# Patient Record
Sex: MALE | Race: WHITE | NOT HISPANIC OR LATINO
[De-identification: names, ages, dates, MRNs, and addresses within clinical notes are randomized per-mention and may not be internally consistent; named-entity substitution may affect disease eponyms.]

---

## 2017-03-25 ENCOUNTER — TRANSCRIPTION ENCOUNTER (OUTPATIENT)
Age: 67
End: 2017-03-25

## 2018-02-18 ENCOUNTER — TRANSCRIPTION ENCOUNTER (OUTPATIENT)
Age: 68
End: 2018-02-18

## 2018-02-28 ENCOUNTER — TRANSCRIPTION ENCOUNTER (OUTPATIENT)
Age: 68
End: 2018-02-28

## 2019-03-27 ENCOUNTER — TRANSCRIPTION ENCOUNTER (OUTPATIENT)
Age: 69
End: 2019-03-27

## 2019-05-09 ENCOUNTER — APPOINTMENT (OUTPATIENT)
Dept: UROLOGY | Facility: CLINIC | Age: 69
End: 2019-05-09
Payer: MEDICARE

## 2019-05-09 VITALS
RESPIRATION RATE: 16 BRPM | TEMPERATURE: 96.8 F | SYSTOLIC BLOOD PRESSURE: 152 MMHG | WEIGHT: 160 LBS | BODY MASS INDEX: 25.11 KG/M2 | HEART RATE: 50 BPM | HEIGHT: 67 IN | DIASTOLIC BLOOD PRESSURE: 80 MMHG

## 2019-05-09 PROCEDURE — 99204 OFFICE O/P NEW MOD 45 MIN: CPT

## 2019-05-09 RX ORDER — TAMSULOSIN HYDROCHLORIDE 0.4 MG/1
CAPSULE ORAL
Refills: 0 | Status: ACTIVE | COMMUNITY

## 2019-05-09 RX ORDER — FINASTERIDE 5 MG/1
5 TABLET, FILM COATED ORAL
Refills: 0 | Status: DISCONTINUED | COMMUNITY
End: 2019-05-09

## 2019-05-09 RX ORDER — FOLIC ACID 1 MG/1
1 TABLET ORAL
Refills: 0 | Status: ACTIVE | COMMUNITY

## 2019-05-09 RX ORDER — METOPROLOL SUCCINATE 50 MG/1
50 TABLET, EXTENDED RELEASE ORAL
Refills: 0 | Status: ACTIVE | COMMUNITY

## 2019-05-09 NOTE — REVIEW OF SYSTEMS
[Wake up at night to urinate  How many times?  ___] : wakes up to urinate [unfilled] times during the night [Strong urge to urinate] : strong urge to urinate [Slow urine stream] : slow urine stream [Interrupted urine stream] : interrupted urine stream [Leakage of urine with straining, coughing, laughing] : leakage of urine with straining, coughing, laughing [Negative] : Heme/Lymph

## 2019-05-09 NOTE — PHYSICAL EXAM
[General Appearance - Well Developed] : well developed [General Appearance - Well Nourished] : well nourished [Well Groomed] : well groomed [Normal Appearance] : normal appearance [General Appearance - In No Acute Distress] : no acute distress [Edema] : no peripheral edema [Respiration, Rhythm And Depth] : normal respiratory rhythm and effort [Exaggerated Use Of Accessory Muscles For Inspiration] : no accessory muscle use [Abdomen Tenderness] : non-tender [Abdomen Soft] : soft [Urethral Meatus] : meatus normal [Costovertebral Angle Tenderness] : no ~M costovertebral angle tenderness [Urinary Bladder Findings] : the bladder was normal on palpation [Scrotum] : the scrotum was normal [Testes Mass (___cm)] : there were no testicular masses [] : no rash [Normal Station and Gait] : the gait and station were normal for the patient's age [No Focal Deficits] : no focal deficits [Oriented To Time, Place, And Person] : oriented to person, place, and time [Mood] : the mood was normal [Affect] : the affect was normal [Not Anxious] : not anxious [No Palpable Adenopathy] : no palpable adenopathy

## 2019-05-10 LAB
PSA FREE FLD-MCNC: 26 %
PSA FREE SERPL-MCNC: 1.16 NG/ML
PSA SERPL-MCNC: 4.52 NG/ML

## 2019-05-10 NOTE — LETTER BODY
[FreeTextEntry1] : Salina Marie MD\par [address] [facility] [address]\[place] [address]\par \par Dear Dr. Marie,\par \par Corey Byers presents to the office today. He is a 69-year-old man who is here with a primary complaint of lower urinary tract symptoms. He has been under the care of another urologist up until now. The other urologist is retiring and he is now transferring his care. He says that he has baseline symptoms of urinary hesitancy and a weak urinary flow. He has nocturia x2. The symptoms are present with the use of tamsulosin and finasteride but are much more significant without the medications. He reports a history of undergoing catheterization for urinary retention. One instance of this occurred after he received sedation for colonoscopy and another episode occurred after he had cardiac stents placed. He denies any episodes of spontaneous urinary retention outside of undergoing procedures in the past. He denies any dysuria. He does notice periodic gross hematuria after he strains with a bowel movement. He does not have any flank pain or suprapubic pain. He feels fairly happy with his current voiding status with dual medical therapy. He denies any urinary incontinence.\par \par The patient denies any change in appetite or unintentional weight loss. There is no chest pain or shortness of breath.\par \par The patient has undergone MRI of the prostate. His prostate has measured 124 cubic centimeters on an MRI from August 2013. He had a targeted biopsy performed after this MRI which did not show any evidence of malignancy. He had a second MRI done more recently in April of 2018 and at this time his prostate measured 94 cubic centimeters. There was a PIRAD 2 lesion read at 5 x 4 mm. He did not have another biopsy after this MRI. Another MRI the prostate was done even after this one which was January 2019. This showed a prostate measuring 96 cubic centimeters. There was also a PIRAD 2 lesion read again at 5 x 3 mm corresponding to the lesion on the prior MRI from 2018. The patient reports that he has had 2 separate biopsies of the prostate in the past.\par \par \par The PSA level has previously been elevated as high as 14 ng/mL. On finasteride his levels have been lower. I checked his PSA level again today which is 4.52 ng/mL. This level as appropriate on finasteride based on his prior PSA history. The free PSA fraction at 26%. Based on this finding and in the context of his prior history of 3 separate MRIs of the prostate and 2 separate biopsies of the prostate all of which have not shown any clear evidence of malignancy, I do not believe he needs to consider additional biopsy at this time.\par \par The patient's primary urologic issue is BPH and bladder outlet obstruction. He would like to continue on medical therapy at this time and his prescriptions for the finasteride and tamsulosin have been renewed. History of retention is mildly concerning that he may develop retention again in the future and I did explain to him that it is a possibility that he might require bladder outlet surgery sometime in the future if his urinary symptoms worsen or if he develops spontaneous urinary retention outside of the context of sedation or another procedure.\par \par I will plan to see him in annually. Please do not hesitate to contact you with any questions or concerns.\par \par Sincerely,\par \par \par \par \par Tanner Sheehan MD, FACS\par  of Urology\par [facility]\par \par [facility]\par Director of Robotics and Minimally Invasive Surgery\par [address] [place] [address]\[place] [address]\par P: [phone]\par F: [phone]\par [email or web address]\par

## 2019-08-27 ENCOUNTER — TRANSCRIPTION ENCOUNTER (OUTPATIENT)
Age: 69
End: 2019-08-27

## 2019-09-06 ENCOUNTER — TRANSCRIPTION ENCOUNTER (OUTPATIENT)
Age: 69
End: 2019-09-06

## 2019-09-08 ENCOUNTER — FORM ENCOUNTER (OUTPATIENT)
Age: 69
End: 2019-09-08

## 2019-09-09 ENCOUNTER — OUTPATIENT (OUTPATIENT)
Dept: OUTPATIENT SERVICES | Facility: HOSPITAL | Age: 69
LOS: 1 days | End: 2019-09-09
Payer: MEDICARE

## 2019-09-09 ENCOUNTER — APPOINTMENT (OUTPATIENT)
Dept: UROLOGY | Facility: CLINIC | Age: 69
End: 2019-09-09
Payer: MEDICARE

## 2019-09-09 ENCOUNTER — APPOINTMENT (OUTPATIENT)
Dept: ULTRASOUND IMAGING | Facility: IMAGING CENTER | Age: 69
End: 2019-09-09

## 2019-09-09 DIAGNOSIS — Z00.8 ENCOUNTER FOR OTHER GENERAL EXAMINATION: ICD-10-CM

## 2019-09-09 PROCEDURE — 76870 US EXAM SCROTUM: CPT | Mod: 26

## 2019-09-09 PROCEDURE — 76870 US EXAM SCROTUM: CPT

## 2019-09-09 PROCEDURE — 99214 OFFICE O/P EST MOD 30 MIN: CPT

## 2019-09-09 NOTE — PHYSICAL EXAM
[General Appearance - Well Nourished] : well nourished [General Appearance - Well Developed] : well developed [Well Groomed] : well groomed [Normal Appearance] : normal appearance [Abdomen Soft] : soft [General Appearance - In No Acute Distress] : no acute distress [Abdomen Tenderness] : non-tender [Costovertebral Angle Tenderness] : no ~M costovertebral angle tenderness [Urethral Meatus] : meatus normal [Urinary Bladder Findings] : the bladder was normal on palpation [Scrotum] : the scrotum was normal [Testes Mass (___cm)] : there were no testicular masses [Edema] : no peripheral edema [] : no respiratory distress [Respiration, Rhythm And Depth] : normal respiratory rhythm and effort [Exaggerated Use Of Accessory Muscles For Inspiration] : no accessory muscle use [Oriented To Time, Place, And Person] : oriented to person, place, and time [Mood] : the mood was normal [Affect] : the affect was normal [Not Anxious] : not anxious [Normal Station and Gait] : the gait and station were normal for the patient's age [No Focal Deficits] : no focal deficits [No Palpable Adenopathy] : no palpable adenopathy [FreeTextEntry1] : Right testis nontender, 15 ccm, left varicocele on coughing, hydrocele around left testis, left testis mildly tender

## 2019-09-09 NOTE — HISTORY OF PRESENT ILLNESS
[FreeTextEntry1] : Corey Byers is a 68 y/o male patient who presents today for a follow up. He is a patient of Dr. Sheehan but he is seeing me today. On a recent trip to [place] he began to notice spots of blood in his underwear. Initially he was not concerned since he has ruptured scrotal blood vessels in the past and has a Hx of BPH and bleeds easily after competing rectal exams. Once he returned from his drip he noticed that the bleeding became more significant.  A burning sensation during urination and pain in the left testicle and groin also began to occur. He went to a local urgent care and was prescribed Ciprofloxacin 500 mg. He states that within hours of taking the first tablet the burning sensation the burning sensation resolved. The blood dripping from the penis has also began to resolve.

## 2019-09-09 NOTE — ASSESSMENT
[FreeTextEntry1] : Corey Byers is a 68 y/o male patient who presents today for a follow up. He is a patient of Dr. Sheehan but he is seeing me today. On a recent trip to [place] he began to notice spots of blood in his underwear. Initially he was not concerned since he has ruptured scrotal blood vessels in the past and has a Hx of BPH and bleeds easily after competing rectal exams. Once he returned from his drip he noticed that the bleeding became more significant.  A burning sensation during urination and pain in the left testicle and groin also began to occur. He went to a local urgent care and was prescribed Ciprofloxacin 500 mg. He states that within hours of taking the first tablet the burning sensation the burning sensation resolved. The blood dripping from the penis has also began to resolve. \par \par It is my opinion today that the cause of the gross blood testicular and groin pain and burning sensation is both prostatitis and orchitis. Upon further questioning he does workout and lift weights and that could be a possible cause. He could have also strained while lifting heavy luggage during his recent trip that could also be a possible cause. \par \par A scrotal ultrasound is ordered today. This is to rule out any further, more malignant, causes of his discomfort. \par It is to be completed today and he is to call to speak with my nurse regarding the results. \par \par Ciprofloxacin 500 mg is prescribed again today. He is to take one tablet every 12 hours until the course is finished. \par \par He is to follow up in 3 weeks for a reevaluation or sooner if clinically indicated.

## 2019-09-09 NOTE — ADDENDUM
[FreeTextEntry1] : This note was authored by Buzz Berry working as scribe for Dr. Sami Mariano. I, Dr. Sami Mariano, have reviewed the content of this note and confirm it is true and accurate. I personally performed the history and physical examination and made all the decisions.\par 9/9/19

## 2019-10-02 ENCOUNTER — APPOINTMENT (OUTPATIENT)
Dept: UROLOGY | Facility: CLINIC | Age: 69
End: 2019-10-02

## 2020-05-21 ENCOUNTER — APPOINTMENT (OUTPATIENT)
Dept: UROLOGY | Facility: CLINIC | Age: 70
End: 2020-05-21

## 2020-09-30 ENCOUNTER — APPOINTMENT (OUTPATIENT)
Dept: UROLOGY | Facility: CLINIC | Age: 70
End: 2020-09-30
Payer: MEDICARE

## 2020-09-30 VITALS — DIASTOLIC BLOOD PRESSURE: 74 MMHG | SYSTOLIC BLOOD PRESSURE: 123 MMHG | HEART RATE: 53 BPM

## 2020-09-30 VITALS — TEMPERATURE: 97.2 F

## 2020-09-30 DIAGNOSIS — Z82.49 FAMILY HISTORY OF ISCHEMIC HEART DISEASE AND OTHER DISEASES OF THE CIRCULATORY SYSTEM: ICD-10-CM

## 2020-09-30 DIAGNOSIS — R39.9 UNSPECIFIED SYMPTOMS AND SIGNS INVOLVING THE GENITOURINARY SYSTEM: ICD-10-CM

## 2020-09-30 DIAGNOSIS — Z87.438 PERSONAL HISTORY OF OTHER DISEASES OF MALE GENITAL ORGANS: ICD-10-CM

## 2020-09-30 DIAGNOSIS — N45.3 EPIDIDYMO-ORCHITIS: ICD-10-CM

## 2020-09-30 PROCEDURE — 99214 OFFICE O/P EST MOD 30 MIN: CPT

## 2020-10-01 LAB
ALP BLD-CCNC: 75 U/L
APPEARANCE: CLEAR
BACTERIA: NEGATIVE
BILIRUBIN URINE: NEGATIVE
BLOOD URINE: NEGATIVE
COLOR: NORMAL
GLUCOSE QUALITATIVE U: NEGATIVE
HYALINE CASTS: 1 /LPF
KETONES URINE: NEGATIVE
LEUKOCYTE ESTERASE URINE: NEGATIVE
MICROSCOPIC-UA: NORMAL
NITRITE URINE: NEGATIVE
PH URINE: 5.5
PROTEIN URINE: NEGATIVE
PSA FREE FLD-MCNC: 27 %
PSA FREE SERPL-MCNC: 1.26 NG/ML
PSA SERPL-MCNC: 4.65 NG/ML
RED BLOOD CELLS URINE: 1 /HPF
SPECIFIC GRAVITY URINE: 1.02
SQUAMOUS EPITHELIAL CELLS: 2 /HPF
UROBILINOGEN URINE: NORMAL
WHITE BLOOD CELLS URINE: 1 /HPF

## 2020-10-02 PROBLEM — Z87.438 HISTORY OF BPH: Status: RESOLVED | Noted: 2020-09-30 | Resolved: 2020-10-02

## 2020-10-02 PROBLEM — R39.9 URINARY SYMPTOM OR SIGN: Status: ACTIVE | Noted: 2020-09-30

## 2020-10-02 PROBLEM — Z82.49 FAMILY HISTORY OF CORONARY ARTERIOSCLEROSIS: Status: ACTIVE | Noted: 2020-09-30

## 2020-10-02 LAB
BACTERIA UR CULT: NORMAL
URINE CYTOLOGY: NORMAL

## 2020-10-02 NOTE — ADDENDUM
[FreeTextEntry1] : I, Sirena Buchananin, acted solely as a scribe for Dr. Sami Mariano on this date 09/30/2020.\par \par All medical record entries made by the Scribe were at my, Dr. Sami Mariano, direction and personally dictated by me on 09/30/2020. I have reviewed the chart and agree that the record accurately reflects my personal performance of the history, physical exam, assessment and plan.  I have also personally directed, reviewed and agreed with the chart.

## 2020-10-02 NOTE — PHYSICAL EXAM
[General Appearance - Well Developed] : well developed [General Appearance - Well Nourished] : well nourished [Normal Appearance] : normal appearance [Abdomen Tenderness] : non-tender [Edema] : no peripheral edema [Oriented To Time, Place, And Person] : oriented to person, place, and time [Normal Station and Gait] : the gait and station were normal for the patient's age [No Focal Deficits] : no focal deficits [FreeTextEntry1] : Knee chest position was used for digital rectal exam. No suspicious rectal masses. No rectal mucosal lesions. Anal tone is normal. The prostate is non tender, with normal texture, discrete borders, and no nodules. It is a 45 gram transurethral resection size. Prostate is wide. No gross blood on examining fingers.  [] : no respiratory distress [Respiration, Rhythm And Depth] : normal respiratory rhythm and effort [Exaggerated Use Of Accessory Muscles For Inspiration] : no accessory muscle use

## 2020-10-02 NOTE — HISTORY OF PRESENT ILLNESS
[Nocturia] : nocturia [Erectile Dysfunction] : Erectile Dysfunction [FreeTextEntry1] : 70 yr male with large BPH , testicular inflammation orchitis 5/19, resolved with abx. \par Doing well on finasteride and flomax . \par Nocturia 2-3x per night \par No blood in urine \par No more testicular pain or discomfort\par Psa 5/19 4.52,  5/20 4.63\par No FHx of prostate CA [Urinary Retention] : no urinary retention [Urinary Urgency] : no urinary urgency [Hematuria - Gross] : no gross hematuria

## 2020-10-02 NOTE — REVIEW OF SYSTEMS
[Nocturia] : nocturia [Fever] : no fever [Chills] : no chills [Shortness Of Breath] : no shortness of breath [Abdominal Pain] : no abdominal pain [Dysuria] : no dysuria [Anxiety] : no anxiety [Easy Bleeding] : no tendency for easy bleeding

## 2020-10-02 NOTE — ASSESSMENT
[Urinary Symptom or Sign (788.99\R39.89)] : implantation [FreeTextEntry1] : 70 yr male with BPH, pSA elevation , LUTs nocturia .  Acute left orchitis May 2019 resolved. \par Following for pSA elevation to rule out prostatic neoplasm\par \par patient concerned for prostate cancer preventive surveillance .  Asking if he was due for another MRI prostate.  Last MRI prostate 2016\par \par Knee chest position was used for digital rectal exam. No suspicious rectal masses. No rectal mucosal lesions. Anal tone is normal. The prostate is non tender, with normal texture, discrete borders, and no nodules. It is a 45 gram transurethral resection size. Prostate is wide. No gross blood on examining fingers. \par \par Repeat prostate mri\par PSA\par Alkaline phosphate\par UA micros\par \par Doing well on finasteride and flomax.  Has retrograde ejaculation but tolerable symptoms per patient. \par Continue finasteride \par Continue flomax

## 2020-12-09 ENCOUNTER — APPOINTMENT (OUTPATIENT)
Dept: MRI IMAGING | Facility: IMAGING CENTER | Age: 70
End: 2020-12-09
Payer: MEDICARE

## 2020-12-09 ENCOUNTER — RESULT REVIEW (OUTPATIENT)
Age: 70
End: 2020-12-09

## 2020-12-09 ENCOUNTER — OUTPATIENT (OUTPATIENT)
Dept: OUTPATIENT SERVICES | Facility: HOSPITAL | Age: 70
LOS: 1 days | End: 2020-12-09
Payer: MEDICARE

## 2020-12-09 DIAGNOSIS — R97.20 ELEVATED PROSTATE SPECIFIC ANTIGEN [PSA]: ICD-10-CM

## 2020-12-09 PROCEDURE — 76377 3D RENDER W/INTRP POSTPROCES: CPT | Mod: 26

## 2020-12-09 PROCEDURE — 72197 MRI PELVIS W/O & W/DYE: CPT | Mod: 26

## 2020-12-09 PROCEDURE — A9585: CPT

## 2020-12-09 PROCEDURE — 76377 3D RENDER W/INTRP POSTPROCES: CPT

## 2020-12-09 PROCEDURE — 72197 MRI PELVIS W/O & W/DYE: CPT

## 2020-12-15 ENCOUNTER — NON-APPOINTMENT (OUTPATIENT)
Age: 70
End: 2020-12-15

## 2020-12-15 ENCOUNTER — APPOINTMENT (OUTPATIENT)
Dept: UROLOGY | Facility: CLINIC | Age: 70
End: 2020-12-15
Payer: MEDICARE

## 2020-12-15 PROCEDURE — 99443: CPT | Mod: 95

## 2020-12-15 NOTE — HISTORY OF PRESENT ILLNESS
[Nocturia] : nocturia [Erectile Dysfunction] : Erectile Dysfunction [FreeTextEntry1] : 70 yr male with large BPH , testicular inflammation orchitis 5/19, resolved with abx. \par Doing well on finasteride and flomax . \par Nocturia 2-3x per night \par No blood in urine \par No more testicular pain or discomfort\par Psa 5/19 4.52,  5/20 4.63\par No FHx of prostate CA\par \par 12/15/2020: The patient gave permission for a telephone visit. His PSA has been elevated and was 4.52 on 9/30/2020. He had an MRI on 12/9/2020 that showed there is an 0.8 x 0.8 cm polypoid lesion at the left bladder wall. This is consistent with bladder malignancy. 147 cc gland with mass effect on the bladder.\par No MRI suspicious prostate lesions. *PIRADS 1 - Very low (clinically significant cancer is highly unlikely to be present). Patient was never a smoker.  [Urinary Retention] : no urinary retention [Urinary Urgency] : no urinary urgency [Hematuria - Gross] : no gross hematuria

## 2020-12-15 NOTE — ASSESSMENT
[FreeTextEntry1] : 70 yr male with BPH, pSA elevation , LUTs nocturia .  Acute left orchitis May 2019 resolved. \par Following for pSA elevation to rule out prostatic neoplasm\par \par patient concerned for prostate cancer preventive surveillance .  Asking if he was due for another MRI prostate.  Last MRI prostate 2016\par Knee chest position was used for digital rectal exam. No suspicious rectal masses. No rectal mucosal lesions. Anal tone is normal. The prostate is non tender, with normal texture, discrete borders, and no nodules. It is a 45 gram transurethral resection size. Prostate is wide. No gross blood on examining fingers. \par Repeat prostate mri\par PSA\par Alkaline phosphate\par UA micros\par Doing well on finasteride and flomax.  Has retrograde ejaculation but tolerable symptoms per patient. \par Continue finasteride \par Continue flomax \par \par 12/15/2020: The patient gave permission for a telephone visit. His PSA has been elevated and was 4.52 on 9/30/2020. He had an MRI on 12/9/2020 that showed there is an 0.8 x 0.8 cm polypoid lesion at the left bladder wall. This is consistent with bladder malignancy. 147 cc gland with mass effect on the bladder.\par No MRI suspicious prostate lesions. *PIRADS 1 - Very low (clinically significant cancer is highly unlikely to be present). Patient was never a smoker. \par \par I discussed the results of the patient's MRI with him. \par The patient will undergo a cystoscopy for the mass found in the bladder on the MRI. Dictation states left bladder wall mass but images show right 0.8 x 0.8 cm bladder mass. Radiology notified of descrepency. I advised the patient to eat on the day of the procedure and can drive themselves if they like. I informed the patient we will insert lidocaine for local anesthesia. I discussed the risk of infection, but informed the patient that we will provide an antibiotic after the procedure and at bedtime. \par \par preparation,  telephone appointment and coordination of care took  30 min.\par

## 2020-12-15 NOTE — ADDENDUM
[FreeTextEntry1] : This note was authored by Esperanza Evans working as a scribe for Dr.Gary Mariano. I, Dr. Sami Mariano have reviewed the content of this note and confirm it is true and accurate. I personally performed the history and physical examination and made all the decisions 12/15/2020.

## 2020-12-15 NOTE — ASSESSMENT
[FreeTextEntry1] : 70 yr male with BPH, pSA elevation , LUTs nocturia .  Acute left orchitis May 2019 resolved. \par Following for pSA elevation to rule out prostatic neoplasm\par \par patient concerned for prostate cancer preventive surveillance .  Asking if he was due for another MRI prostate.  Last MRI prostate 2016\par Knee chest position was used for digital rectal exam. No suspicious rectal masses. No rectal mucosal lesions. Anal tone is normal. The prostate is non tender, with normal texture, discrete borders, and no nodules. It is a 45 gram transurethral resection size. Prostate is wide. No gross blood on examining fingers. \par Repeat prostate mri\par PSA\par Alkaline phosphate\par UA micros\par Doing well on finasteride and flomax.  Has retrograde ejaculation but tolerable symptoms per patient. \par Continue finasteride \par Continue flomax \par \par 12/15/2020: The patient gave permission for a telephone visit. His PSA has been elevated and was 4.52 on 9/30/2020. He had an MRI on 12/9/2020 that showed there is an 0.8 x 0.8 cm polypoid lesion at the left bladder wall. This is consistent with bladder malignancy. 147 cc gland with mass effect on the bladder.\par No MRI suspicious prostate lesions. *PIRADS 1 - Very low (clinically significant cancer is highly unlikely to be present). Patient was never a smoker. \par \par I discussed the results of the patient's MRI with him. \par The patient will undergo a cystoscopy for the mass found in the bladder on the MRI. Dictation states left bladder wall mass but images show right 0.8 x 0.8 cm bladder mass. Radiology notified of descrepency. I advised the patient to eat on the day of the procedure and can drive themselves if they like. I informed the patient we will insert lidocaine for local anesthesia. I discussed the risk of infection, but informed the patient that we will provide an antibiotic after the procedure and at bedtime. \par

## 2021-01-19 ENCOUNTER — APPOINTMENT (OUTPATIENT)
Dept: UROLOGY | Facility: CLINIC | Age: 71
End: 2021-01-19
Payer: MEDICARE

## 2021-01-19 ENCOUNTER — OUTPATIENT (OUTPATIENT)
Dept: OUTPATIENT SERVICES | Facility: HOSPITAL | Age: 71
LOS: 1 days | End: 2021-01-19
Payer: MEDICARE

## 2021-01-19 VITALS
RESPIRATION RATE: 16 BRPM | SYSTOLIC BLOOD PRESSURE: 157 MMHG | TEMPERATURE: 97.6 F | HEART RATE: 60 BPM | DIASTOLIC BLOOD PRESSURE: 89 MMHG

## 2021-01-19 DIAGNOSIS — R35.0 FREQUENCY OF MICTURITION: ICD-10-CM

## 2021-01-19 PROCEDURE — 52000 CYSTOURETHROSCOPY: CPT

## 2021-01-19 PROCEDURE — 88112 CYTOPATH CELL ENHANCE TECH: CPT | Mod: 26

## 2021-01-19 PROCEDURE — 99214 OFFICE O/P EST MOD 30 MIN: CPT | Mod: 25

## 2021-01-19 NOTE — PHYSICAL EXAM
[General Appearance - Well Developed] : well developed [Normal Appearance] : normal appearance [General Appearance - Well Nourished] : well nourished [Well Groomed] : well groomed [General Appearance - In No Acute Distress] : no acute distress [Abdomen Soft] : soft [Abdomen Tenderness] : non-tender [Edema] : no peripheral edema [] : no respiratory distress [Respiration, Rhythm And Depth] : normal respiratory rhythm and effort [Oriented To Time, Place, And Person] : oriented to person, place, and time [Exaggerated Use Of Accessory Muscles For Inspiration] : no accessory muscle use [Affect] : the affect was normal [Mood] : the mood was normal [Not Anxious] : not anxious [Normal Station and Gait] : the gait and station were normal for the patient's age [No Focal Deficits] : no focal deficits

## 2021-01-20 ENCOUNTER — RESULT REVIEW (OUTPATIENT)
Age: 71
End: 2021-01-20

## 2021-01-20 ENCOUNTER — OUTPATIENT (OUTPATIENT)
Dept: OUTPATIENT SERVICES | Facility: HOSPITAL | Age: 71
LOS: 1 days | End: 2021-01-20
Payer: MEDICARE

## 2021-01-20 ENCOUNTER — APPOINTMENT (OUTPATIENT)
Dept: CT IMAGING | Facility: IMAGING CENTER | Age: 71
End: 2021-01-20
Payer: MEDICARE

## 2021-01-20 ENCOUNTER — NON-APPOINTMENT (OUTPATIENT)
Age: 71
End: 2021-01-20

## 2021-01-20 ENCOUNTER — TRANSCRIPTION ENCOUNTER (OUTPATIENT)
Age: 71
End: 2021-01-20

## 2021-01-20 DIAGNOSIS — R35.0 FREQUENCY OF MICTURITION: ICD-10-CM

## 2021-01-20 DIAGNOSIS — N32.89 OTHER SPECIFIED DISORDERS OF BLADDER: ICD-10-CM

## 2021-01-20 LAB
APPEARANCE: ABNORMAL
BACTERIA UR CULT: ABNORMAL
BACTERIA: NEGATIVE
BILIRUBIN URINE: NEGATIVE
BLOOD URINE: ABNORMAL
COLOR: YELLOW
GLUCOSE QUALITATIVE U: NEGATIVE
HYALINE CASTS: 4 /LPF
KETONES URINE: NEGATIVE
LEUKOCYTE ESTERASE URINE: ABNORMAL
MICROSCOPIC-UA: NORMAL
NITRITE URINE: POSITIVE
PH URINE: 6
PROTEIN URINE: ABNORMAL
RED BLOOD CELLS URINE: 83 /HPF
SPECIFIC GRAVITY URINE: 1.02
SQUAMOUS EPITHELIAL CELLS: 3 /HPF
URINE CYTOLOGY: NORMAL
UROBILINOGEN URINE: NORMAL
WHITE BLOOD CELLS URINE: 85 /HPF

## 2021-01-20 PROCEDURE — G1004: CPT

## 2021-01-20 PROCEDURE — 82565 ASSAY OF CREATININE: CPT

## 2021-01-20 PROCEDURE — 74178 CT ABD&PLV WO CNTR FLWD CNTR: CPT

## 2021-01-20 PROCEDURE — 74178 CT ABD&PLV WO CNTR FLWD CNTR: CPT | Mod: 26,MG

## 2021-01-20 NOTE — HISTORY OF PRESENT ILLNESS
[Nocturia] : nocturia [Erectile Dysfunction] : Erectile Dysfunction [FreeTextEntry1] : 12/15/2020:70 yr male with large BPH , testicular inflammation orchitis 5/19, resolved with abx. \par Doing well on finasteride and flomax . \par Nocturia 2-3x per night \par No blood in urine \par No more testicular pain or discomfort\par Psa 5/19 4.52,  5/20 4.63\par No FHx of prostate CA\par Patient is an  and works in his own firm. \par \par 12/15/2020: The patient gave permission for a telephone visit. His PSA has been elevated and was 4.52 on 9/30/2020. He had an MRI on 12/9/2020 that showed there is an 0.8 x 0.8 cm polypoid lesion at the left bladder wall. This is consistent with bladder malignancy. 147 cc gland with mass effect on the bladder.\par No MRI suspicious prostate lesions. *PIRADS 1 - Very low (clinically significant cancer is highly unlikely to be present). Patient was never a smoker. \par \par 01/19/2021: The patient presents today for a cystoscopy for a mass found in the bladder on the MRI which also stated PIRADS-1. The patient denies hematuria. He is on a diuretic and has some urinary frequency. He reports stable urination patterns. THe patient recently had a renal sonogram due to an elevated creatinine. He reports his testicles are feeling good and no longer ache. The patient was concerned at the risk of the tumors found on cystoscopy being cancerous.  [Urinary Retention] : no urinary retention [Urinary Urgency] : no urinary urgency [Hematuria - Gross] : no gross hematuria

## 2021-01-20 NOTE — REVIEW OF SYSTEMS
[Fever] : no fever [Chills] : no chills [Feeling Poorly] : not feeling poorly [Shortness Of Breath] : no shortness of breath [Cough] : no cough [Testicular Pain] : no testicular pain [Difficulty Walking] : no difficulty walking

## 2021-01-20 NOTE — ADDENDUM
[FreeTextEntry1] : This note was authored by Esperanza Evans working as a scribe for Dr.Gary Mariano. I, Dr. Sami Mariano have reviewed the content of this note and confirm it is true and accurate. I personally performed the history and physical examination and made all the decisions 01/19/2021.

## 2021-01-20 NOTE — ASSESSMENT
[FreeTextEntry1] : 12/15/2020: 70 yr male with BPH, pSA elevation , LUTs nocturia .  Acute left orchitis May 2019 resolved. \par Following for pSA elevation to rule out prostatic neoplasm\par \par patient concerned for prostate cancer preventive surveillance .  Asking if he was due for another MRI prostate.  Last MRI prostate 2016\par Knee chest position was used for digital rectal exam. No suspicious rectal masses. No rectal mucosal lesions. Anal tone is normal. The prostate is non tender, with normal texture, discrete borders, and no nodules. It is a 45 gram transurethral resection size. Prostate is wide. No gross blood on examining fingers. \par Repeat prostate mri\par PSA\par Alkaline phosphate\par UA micros\par Doing well on finasteride and flomax.  Has retrograde ejaculation but tolerable symptoms per patient. \par Continue finasteride \par Continue flomax \par \par 12/15/2020: The patient gave permission for a telephone visit. His PSA has been elevated and was 4.52 on 9/30/2020. He had an MRI on 12/9/2020 that showed there is an 0.8 x 0.8 cm polypoid lesion at the left bladder wall. This is consistent with bladder malignancy. 147 cc gland with mass effect on the bladder.\par No MRI suspicious prostate lesions. *PIRADS 1 - Very low (clinically significant cancer is highly unlikely to be present). Patient was never a smoker. \par \par I discussed the results of the patient's MRI with him. \par The patient will undergo a cystoscopy for the mass found in the bladder on the MRI. Dictation states left bladder wall mass but images show right 0.8 x 0.8 cm bladder mass. Radiology notified of discrepancy. I advised the patient to eat on the day of the procedure and can drive themselves if they like. I informed the patient we will insert lidocaine for local anesthesia. I discussed the risk of infection, but informed the patient that we will provide an antibiotic after the procedure and at bedtime. \par \par 01/19/2021: The patient presents today for a cystoscopy for a mass found in the bladder on the MRI which also stated PIRADS-1. The patient denies hematuria. He is on a diuretic and has some urinary frequency. He reports stable urination patterns. THe patient recently had a renal sonogram due to an elevated creatinine. He reports his testicles are feeling good and no longer ache. The patient was concerned at the risk of the tumors found on cystoscopy being cancerous. \par \par Lidocaine jelly was injected for lubrication and numbing. Had no urethral tumors, strictures or stones. Lateral lobe hypertrophy of the prostate. No bladder stones. 2+ trabeculated bladder. Right inferior bladder wall was deeply erythematous and had a carpet of papillary growth as well as two dome like papillary tumors, one larger about 2 cm in diameter and the other 0.8 cm in diameter. Both are suspicious for malignancy. The patient took one Bactrim tablet at the time of the procedure and will take one before bed. \par \par I informed him that it is about an 80% chance of the bladder tumors being cancer. \par \par I recommended shaving out the bladder tumors to send them for pathology. I explained to him the procedure process, risks and preventative measures after the procedure including medications. I informed him about the options he has if it is high grade. I am recommending him to  for the procedure who will further discuss it with him. \par \par I am sending the pt for a CT urogram since I explained to him that 5% of people who have these tumors in the bladder also have it in the upper tract. The patient will have his recent blood work faxed over. \par \par The patient will RTO after the CT urogram. \par

## 2021-01-21 ENCOUNTER — NON-APPOINTMENT (OUTPATIENT)
Age: 71
End: 2021-01-21

## 2021-01-22 ENCOUNTER — APPOINTMENT (OUTPATIENT)
Dept: UROLOGY | Facility: CLINIC | Age: 71
End: 2021-01-22
Payer: MEDICARE

## 2021-01-22 DIAGNOSIS — N32.89 OTHER SPECIFIED DISORDERS OF BLADDER: ICD-10-CM

## 2021-01-22 DIAGNOSIS — N40.1 BENIGN PROSTATIC HYPERPLASIA WITH LOWER URINARY TRACT SYMPTOMS: ICD-10-CM

## 2021-01-22 DIAGNOSIS — R97.20 ELEVATED PROSTATE SPECIFIC ANTIGEN [PSA]: ICD-10-CM

## 2021-01-22 PROCEDURE — 99214 OFFICE O/P EST MOD 30 MIN: CPT

## 2021-01-22 NOTE — HISTORY OF PRESENT ILLNESS
[Nocturia] : nocturia [Erectile Dysfunction] : Erectile Dysfunction [FreeTextEntry1] : 12/15/2020:70 yr male with large BPH , testicular inflammation orchitis 5/19, resolved with abx. \par Doing well on finasteride and flomax . \par Nocturia 2-3x per night \par No blood in urine \par No more testicular pain or discomfort\par Psa 5/19 4.52,  5/20 4.63\par No FHx of prostate CA\par Patient is an  and works in his own firm. \par \par 12/15/2020: The patient gave permission for a telephone visit. His PSA has been elevated and was 4.52 on 9/30/2020. He had an MRI on 12/9/2020 that showed there is an 0.8 x 0.8 cm polypoid lesion at the left bladder wall. This is consistent with bladder malignancy. 147 cc gland with mass effect on the bladder.\par No MRI suspicious prostate lesions. *PIRADS 1 - Very low (clinically significant cancer is highly unlikely to be present). Patient was never a smoker. \par \par 01/19/2021: The patient presents today for a cystoscopy for a mass found in the bladder on the MRI which also stated PIRADS-1. The patient denies hematuria. He is on a diuretic and has some urinary frequency. He reports stable urination patterns. THe patient recently had a renal sonogram due to an elevated creatinine. He reports his testicles are feeling good and no longer ache. The patient was concerned at the risk of the tumors found on cystoscopy being cancerous. \par \par 01/22/2021: The patient presents today to review the results of his most recent CT urogram. On his last visit he underwent a cystoscopy that showed right inferior bladder wall was deeply erythematous and had a carpet of papillary growth as well as two dome like papillary tumors, one larger about 2 cm in diameter and the other 0.8 cm in diameter. Both were suspicious for malignancy. I conferred with Dr.Eran Gallagher on his CT urogram from 1/20/2021. It showed enhancing right bladder wall mass in keeping with the clinical history of bladder tumor. No evidence of metastatic disease or suspicious lymphadenopathy in the abdomen and pelvis.Left renal cysts visualized. He appears good in the upper tracts. Enhancement seems confined to mucosa. No gross invasion of muscular layer. Prostate is very large. No ureteral or renal tumors in collecting system or parenchyma. Nocturia 2-3x. Denies any lower back pain. He takes aspirin every day and has 3 stents.  [Urinary Retention] : no urinary retention [Urinary Urgency] : no urinary urgency [Hematuria - Gross] : no gross hematuria

## 2021-01-22 NOTE — REVIEW OF SYSTEMS
[Nocturia] : nocturia [Feeling Poorly] : not feeling poorly [Dysuria] : no dysuria [Bladder Pain] : no bladder pain [Burning Sensation] : no burning sensation during urination [Lower Back Pain] : no lower back pain [Change In Personality] : no personality change

## 2021-01-22 NOTE — PHYSICAL EXAM
[General Appearance - Well Developed] : well developed [General Appearance - Well Nourished] : well nourished [Normal Appearance] : normal appearance [Well Groomed] : well groomed [General Appearance - In No Acute Distress] : no acute distress [] : no rash [Oriented To Time, Place, And Person] : oriented to person, place, and time [Affect] : the affect was normal [Mood] : the mood was normal [Not Anxious] : not anxious [No Focal Deficits] : no focal deficits

## 2021-01-22 NOTE — ASSESSMENT
[FreeTextEntry1] : 12/15/2020: 70 yr male with BPH, pSA elevation , LUTs nocturia .  Acute left orchitis May 2019 resolved. \par Following for pSA elevation to rule out prostatic neoplasm\par \par patient concerned for prostate cancer preventive surveillance .  Asking if he was due for another MRI prostate.  Last MRI prostate 2016\par Knee chest position was used for digital rectal exam. No suspicious rectal masses. No rectal mucosal lesions. Anal tone is normal. The prostate is non tender, with normal texture, discrete borders, and no nodules. It is a 45 gram transurethral resection size. Prostate is wide. No gross blood on examining fingers. \par Repeat prostate mri\par PSA\par Alkaline phosphate\par UA micros\par Doing well on finasteride and flomax.  Has retrograde ejaculation but tolerable symptoms per patient. \par Continue finasteride \par Continue flomax \par \par 12/15/2020: The patient gave permission for a telephone visit. His PSA has been elevated and was 4.52 on 9/30/2020. He had an MRI on 12/9/2020 that showed there is an 0.8 x 0.8 cm polypoid lesion at the left bladder wall. This is consistent with bladder malignancy. 147 cc gland with mass effect on the bladder.\par No MRI suspicious prostate lesions. *PIRADS 1 - Very low (clinically significant cancer is highly unlikely to be present). Patient was never a smoker. \par \par I discussed the results of the patient's MRI with him. \par The patient will undergo a cystoscopy for the mass found in the bladder on the MRI. Dictation states left bladder wall mass but images show right 0.8 x 0.8 cm bladder mass. Radiology notified of discrepancy. I advised the patient to eat on the day of the procedure and can drive themselves if they like. I informed the patient we will insert lidocaine for local anesthesia. I discussed the risk of infection, but informed the patient that we will provide an antibiotic after the procedure and at bedtime. \par \par 01/19/2021: The patient presents today for a cystoscopy for a mass found in the bladder on the MRI which also stated PIRADS-1. The patient denies hematuria. He is on a diuretic and has some urinary frequency. He reports stable urination patterns. THe patient recently had a renal sonogram due to an elevated creatinine. He reports his testicles are feeling good and no longer ache. The patient was concerned at the risk of the tumors found on cystoscopy being cancerous. \par \par Lidocaine jelly was injected for lubrication and numbing. Had no urethral tumors, strictures or stones. Lateral lobe hypertrophy of the prostate. No bladder stones. 2+ trabeculated bladder. Right inferior bladder wall was deeply erythematous and had a carpet of papillary growth as well as two dome like papillary tumors, one larger about 2 cm in diameter and the other 0.8 cm in diameter. Both are suspicious for malignancy. The patient took one Bactrim tablet at the time of the procedure and will take one before bed. \par \par I informed him that it is about an 80% chance of the bladder tumors being cancer. \par I recommended shaving out the bladder tumors to send them for pathology. I explained to him the procedure process, risks and preventative measures after the procedure including medications. I informed him about the options he has if it is high grade. I am recommending him to  for the procedure who will further discuss it with him. \par \par I am sending the pt for a CT urogram since I explained to him that 5% of people who have these tumors in the bladder also have it in the upper tract. The patient will have his recent blood work faxed over. \par The patient will RTO after the CT urogram. \par \par 01/22/2021: The patient presents today to review the results of his most recent CT urogram. On his last visit he underwent a cystoscopy that showed right inferior bladder wall was deeply erythematous and had a carpet of papillary growth as well as two dome like papillary tumors, one larger about 2 cm in diameter and the other 0.8 cm in diameter. Both were suspicious for malignancy. I conferred with Dr.Eran Gallagher on his CT urogram from 1/20/2021. It showed enhancing right bladder wall mass in keeping with the clinical history of bladder tumor. No evidence of metastatic disease or suspicious lymphadenopathy in the abdomen and pelvis.Left renal cysts visualized. He appears good in the upper tracts. Enhancement seems confined to mucosa. No gross invasion of muscular layer. Prostate is very large. No ureteral or renal tumors in collecting system or parenchyma. Nocturia 2-3x. Denies any lower back pain. He takes aspirin every day and has 3 stents. \par \par I informed him he can stay on the aspirin for the procedure. \par \par The pt will undergo a TURBT with  and is scheduled for it on 2/2/2021.

## 2021-01-27 ENCOUNTER — OUTPATIENT (OUTPATIENT)
Dept: OUTPATIENT SERVICES | Facility: HOSPITAL | Age: 71
LOS: 1 days | End: 2021-01-27

## 2021-01-27 VITALS
OXYGEN SATURATION: 98 % | WEIGHT: 160.06 LBS | RESPIRATION RATE: 16 BRPM | HEIGHT: 66 IN | SYSTOLIC BLOOD PRESSURE: 110 MMHG | TEMPERATURE: 98 F | DIASTOLIC BLOOD PRESSURE: 80 MMHG | HEART RATE: 67 BPM

## 2021-01-27 DIAGNOSIS — Z95.5 PRESENCE OF CORONARY ANGIOPLASTY IMPLANT AND GRAFT: ICD-10-CM

## 2021-01-27 DIAGNOSIS — Z98.1 ARTHRODESIS STATUS: Chronic | ICD-10-CM

## 2021-01-27 DIAGNOSIS — G47.33 OBSTRUCTIVE SLEEP APNEA (ADULT) (PEDIATRIC): ICD-10-CM

## 2021-01-27 DIAGNOSIS — Z88.0 ALLERGY STATUS TO PENICILLIN: ICD-10-CM

## 2021-01-27 DIAGNOSIS — Z95.5 PRESENCE OF CORONARY ANGIOPLASTY IMPLANT AND GRAFT: Chronic | ICD-10-CM

## 2021-01-27 DIAGNOSIS — C67.8 MALIGNANT NEOPLASM OF OVERLAPPING SITES OF BLADDER: ICD-10-CM

## 2021-01-27 DIAGNOSIS — Z98.890 OTHER SPECIFIED POSTPROCEDURAL STATES: Chronic | ICD-10-CM

## 2021-01-27 DIAGNOSIS — I10 ESSENTIAL (PRIMARY) HYPERTENSION: ICD-10-CM

## 2021-01-27 LAB
ALBUMIN SERPL ELPH-MCNC: 4.3 G/DL — SIGNIFICANT CHANGE UP (ref 3.3–5)
ALP SERPL-CCNC: 82 U/L — SIGNIFICANT CHANGE UP (ref 40–120)
ALT FLD-CCNC: 18 U/L — SIGNIFICANT CHANGE UP (ref 4–41)
ANION GAP SERPL CALC-SCNC: 11 MMOL/L — SIGNIFICANT CHANGE UP (ref 7–14)
AST SERPL-CCNC: 16 U/L — SIGNIFICANT CHANGE UP (ref 4–40)
BILIRUB SERPL-MCNC: 0.6 MG/DL — SIGNIFICANT CHANGE UP (ref 0.2–1.2)
BUN SERPL-MCNC: 23 MG/DL — SIGNIFICANT CHANGE UP (ref 7–23)
CALCIUM SERPL-MCNC: 9.2 MG/DL — SIGNIFICANT CHANGE UP (ref 8.4–10.5)
CHLORIDE SERPL-SCNC: 102 MMOL/L — SIGNIFICANT CHANGE UP (ref 98–107)
CO2 SERPL-SCNC: 32 MMOL/L — HIGH (ref 22–31)
CREAT SERPL-MCNC: 1.37 MG/DL — HIGH (ref 0.5–1.3)
GLUCOSE SERPL-MCNC: 93 MG/DL — SIGNIFICANT CHANGE UP (ref 70–99)
HCT VFR BLD CALC: 48.7 % — SIGNIFICANT CHANGE UP (ref 39–50)
HGB BLD-MCNC: 15.7 G/DL — SIGNIFICANT CHANGE UP (ref 13–17)
MCHC RBC-ENTMCNC: 29 PG — SIGNIFICANT CHANGE UP (ref 27–34)
MCHC RBC-ENTMCNC: 32.2 GM/DL — SIGNIFICANT CHANGE UP (ref 32–36)
MCV RBC AUTO: 90 FL — SIGNIFICANT CHANGE UP (ref 80–100)
NRBC # BLD: 0 /100 WBCS — SIGNIFICANT CHANGE UP
NRBC # FLD: 0 K/UL — SIGNIFICANT CHANGE UP
PLATELET # BLD AUTO: 178 K/UL — SIGNIFICANT CHANGE UP (ref 150–400)
POTASSIUM SERPL-MCNC: 3.2 MMOL/L — LOW (ref 3.5–5.3)
POTASSIUM SERPL-SCNC: 3.2 MMOL/L — LOW (ref 3.5–5.3)
PROT SERPL-MCNC: 6.9 G/DL — SIGNIFICANT CHANGE UP (ref 6–8.3)
RBC # BLD: 5.41 M/UL — SIGNIFICANT CHANGE UP (ref 4.2–5.8)
RBC # FLD: 13.3 % — SIGNIFICANT CHANGE UP (ref 10.3–14.5)
SODIUM SERPL-SCNC: 145 MMOL/L — SIGNIFICANT CHANGE UP (ref 135–145)
WBC # BLD: 5.8 K/UL — SIGNIFICANT CHANGE UP (ref 3.8–10.5)
WBC # FLD AUTO: 5.8 K/UL — SIGNIFICANT CHANGE UP (ref 3.8–10.5)

## 2021-01-27 RX ORDER — SODIUM CHLORIDE 9 MG/ML
1000 INJECTION, SOLUTION INTRAVENOUS
Refills: 0 | Status: DISCONTINUED | OUTPATIENT
Start: 2021-02-02 | End: 2021-02-02

## 2021-01-27 NOTE — H&P PST ADULT - NEGATIVE HEMATOLOGY SYMPTOMS
CERTIFICATE OF WORK    3/7/2018      Re: Cristin Nava           [facility] [id number]      This is to certify that Cristin Nava has been under my care from 3/7/2018 and can return 3/12/2018. May return sooner if feeling well enough.         Please feel free to contact my office with any questions or concerns at the number listed above.        SIGNATURE:___________________________________________           [facility]  [address] [place]  [address]  [phone]        
      EXCUSE SLIP    March 15, 2018      Re:   Cristin Nava   [facility] [address]                   This is to certify that Cristin Nava had an appointment at this office for professional attention on: 3/5/2018      Please excuse her:    FROM:   DUE TO:     [x] Work  []Injury  [] School  [x]Illness  [] Gym  []Other  [] Other        Comments:Please excuse from 3/5 to 3/11/2018 and can return to work on 3/12/2018.    Thank You,           Delicia Samuel PA-C  [address] [facility] [address] [phone]    
no gum bleeding/no nose bleeding/no skin lumps

## 2021-01-27 NOTE — H&P PST ADULT - TEMPERATURE IN CELSIUS (DEGREES C)
----- Message from Amanda Singh MD sent at 7/21/2020  9:28 AM CDT -----  Schedule CBC,CMP and see me in 2 weeks on [facility] (live visit) and for FOLFOX. DC pump on day 3. Neulasta on day 3   36.5

## 2021-01-27 NOTE — H&P PST ADULT - HISTORY OF PRESENT ILLNESS
This is a 70 y.o. male with malignant neoplasm of overlapping site. Pt had MRI of abdomen , cystoscopy done . Pt had CT done . Pt treated with levofloxacin 1/20/21 for urinary tract infection by Dr Mariano. Pt was referred to Dr Sheehan . Pt now for surgery .

## 2021-01-27 NOTE — H&P PST ADULT - ACTIVITY
cardio 20 min 3-4 times weekly , strength train 15 min 3-4 times weekly , stairs 6 flights daily , ADL's

## 2021-01-27 NOTE — H&P PST ADULT - NSICDXPASTSURGICALHX_GEN_ALL_CORE_FT
PAST SURGICAL HISTORY:  H/O laminectomy no hardware 1970    S/P spinal fusion 1975    Stented coronary artery 2016    Stented coronary artery times 3 ; 2016 [facility]

## 2021-01-27 NOTE — H&P PST ADULT - MUSCULOSKELETAL
details… ROM intact/no joint swelling/no joint erythema/no joint warmth/no calf tenderness detailed exam

## 2021-01-27 NOTE — H&P PST ADULT - NSICDXPROBLEM_GEN_ALL_CORE_FT
PROBLEM DIAGNOSES  Problem: Malignant neoplasm of overlapping sites of bladder  Assessment and Plan: Cystoscopy ,bipolar transurethral resection of bladder   Medical clearance as per Dr Sheehan   Famotidine given as per LI recommendation     Problem: Hypertension  Assessment and Plan: pt instructed to take metoprolol day of surgery . Monitor BP during hospital stay .     Problem: Stented coronary artery  Assessment and Plan: pt instructed to continue aspirin until OR , follow up email to Dr Sheehan to confirm .     Problem: PATRICIA (obstructive sleep apnea)  Assessment and Plan: stop bang questionaire positive ; OR faxed     Problem: Allergy to penicillin  Assessment and Plan: OR faxed

## 2021-01-27 NOTE — H&P PST ADULT - NSICDXPASTMEDICALHX_GEN_ALL_CORE_FT
PAST MEDICAL HISTORY:  Enlargement (benign) of prostate     Hypertension     Urinary tract infection started on antibiotic 1/20/21

## 2021-01-28 DIAGNOSIS — Z01.818 ENCOUNTER FOR OTHER PREPROCEDURAL EXAMINATION: ICD-10-CM

## 2021-01-28 LAB
CULTURE RESULTS: NO GROWTH — SIGNIFICANT CHANGE UP
SPECIMEN SOURCE: SIGNIFICANT CHANGE UP

## 2021-01-29 ENCOUNTER — APPOINTMENT (OUTPATIENT)
Dept: DISASTER EMERGENCY | Facility: CLINIC | Age: 71
End: 2021-01-29

## 2021-01-30 ENCOUNTER — APPOINTMENT (OUTPATIENT)
Dept: DISASTER EMERGENCY | Facility: CLINIC | Age: 71
End: 2021-01-30

## 2021-01-31 LAB — SARS-COV-2 N GENE NPH QL NAA+PROBE: NOT DETECTED

## 2021-02-01 ENCOUNTER — TRANSCRIPTION ENCOUNTER (OUTPATIENT)
Age: 71
End: 2021-02-01

## 2021-02-01 NOTE — ASU PATIENT PROFILE, ADULT - PMH
Enlargement (benign) of prostate    Hypertension    Urinary tract infection  started on antibiotic 1/20/21

## 2021-02-01 NOTE — ASU PATIENT PROFILE, ADULT - PSH
H/O laminectomy  no hardware 1970  S/P spinal fusion  1975  Stented coronary artery  times 3 ; 2016 [facility]  Stented coronary artery  2016

## 2021-02-02 ENCOUNTER — INPATIENT (INPATIENT)
Facility: HOSPITAL | Age: 71
LOS: 0 days | Discharge: HOME | End: 2021-02-03
Attending: UROLOGY | Admitting: UROLOGY
Payer: MEDICARE

## 2021-02-02 ENCOUNTER — RESULT REVIEW (OUTPATIENT)
Age: 71
End: 2021-02-02

## 2021-02-02 ENCOUNTER — APPOINTMENT (OUTPATIENT)
Dept: UROLOGY | Facility: HOSPITAL | Age: 71
End: 2021-02-02

## 2021-02-02 VITALS
RESPIRATION RATE: 16 BRPM | HEIGHT: 67 IN | DIASTOLIC BLOOD PRESSURE: 92 MMHG | HEART RATE: 49 BPM | SYSTOLIC BLOOD PRESSURE: 132 MMHG | WEIGHT: 160.06 LBS | TEMPERATURE: 98 F | OXYGEN SATURATION: 95 %

## 2021-02-02 DIAGNOSIS — Z98.1 ARTHRODESIS STATUS: Chronic | ICD-10-CM

## 2021-02-02 DIAGNOSIS — Z95.5 PRESENCE OF CORONARY ANGIOPLASTY IMPLANT AND GRAFT: Chronic | ICD-10-CM

## 2021-02-02 DIAGNOSIS — C67.8 MALIGNANT NEOPLASM OF OVERLAPPING SITES OF BLADDER: ICD-10-CM

## 2021-02-02 DIAGNOSIS — Z98.890 OTHER SPECIFIED POSTPROCEDURAL STATES: Chronic | ICD-10-CM

## 2021-02-02 LAB — POTASSIUM BLDV-SCNC: 3.9 MMOL/L — SIGNIFICANT CHANGE UP (ref 3.4–4.5)

## 2021-02-02 PROCEDURE — 88307 TISSUE EXAM BY PATHOLOGIST: CPT | Mod: 26

## 2021-02-02 PROCEDURE — 52235 CYSTOSCOPY AND TREATMENT: CPT

## 2021-02-02 RX ORDER — HYDRALAZINE HCL 50 MG
5 TABLET ORAL ONCE
Refills: 0 | Status: COMPLETED | OUTPATIENT
Start: 2021-02-02 | End: 2021-02-02

## 2021-02-02 RX ORDER — TAMSULOSIN HYDROCHLORIDE 0.4 MG/1
0.4 CAPSULE ORAL AT BEDTIME
Refills: 0 | Status: DISCONTINUED | OUTPATIENT
Start: 2021-02-02 | End: 2021-02-03

## 2021-02-02 RX ORDER — FINASTERIDE 5 MG/1
5 TABLET, FILM COATED ORAL DAILY
Refills: 0 | Status: DISCONTINUED | OUTPATIENT
Start: 2021-02-02 | End: 2021-02-02

## 2021-02-02 RX ORDER — ATORVASTATIN CALCIUM 80 MG/1
1 TABLET, FILM COATED ORAL
Qty: 0 | Refills: 0 | DISCHARGE

## 2021-02-02 RX ORDER — SODIUM CHLORIDE 9 MG/ML
1000 INJECTION, SOLUTION INTRAVENOUS
Refills: 0 | Status: DISCONTINUED | OUTPATIENT
Start: 2021-02-02 | End: 2021-02-03

## 2021-02-02 RX ORDER — METOPROLOL TARTRATE 50 MG
50 TABLET ORAL DAILY
Refills: 0 | Status: DISCONTINUED | OUTPATIENT
Start: 2021-02-02 | End: 2021-02-03

## 2021-02-02 RX ORDER — TAMSULOSIN HYDROCHLORIDE 0.4 MG/1
1 CAPSULE ORAL
Qty: 0 | Refills: 0 | DISCHARGE

## 2021-02-02 RX ORDER — ATORVASTATIN CALCIUM 80 MG/1
40 TABLET, FILM COATED ORAL AT BEDTIME
Refills: 0 | Status: DISCONTINUED | OUTPATIENT
Start: 2021-02-02 | End: 2021-02-03

## 2021-02-02 RX ORDER — FINASTERIDE 5 MG/1
5 TABLET, FILM COATED ORAL DAILY
Refills: 0 | Status: DISCONTINUED | OUTPATIENT
Start: 2021-02-02 | End: 2021-02-03

## 2021-02-02 RX ORDER — CIPROFLOXACIN LACTATE 400MG/40ML
400 VIAL (ML) INTRAVENOUS EVERY 12 HOURS
Refills: 0 | Status: DISCONTINUED | OUTPATIENT
Start: 2021-02-02 | End: 2021-02-03

## 2021-02-02 RX ORDER — FOLIC ACID 0.8 MG
1 TABLET ORAL
Qty: 0 | Refills: 0 | DISCHARGE

## 2021-02-02 RX ORDER — ASPIRIN/CALCIUM CARB/MAGNESIUM 324 MG
0 TABLET ORAL
Qty: 0 | Refills: 0 | DISCHARGE

## 2021-02-02 RX ORDER — FINASTERIDE 5 MG/1
1 TABLET, FILM COATED ORAL
Qty: 0 | Refills: 0 | DISCHARGE

## 2021-02-02 RX ORDER — OLMESARTAN MEDOXOMIL / AMLODIPINE BESYLATE / HYDROCHLOROTHIAZIDE 40; 10; 25 MG/1; MG/1; MG/1
1 TABLET, FILM COATED ORAL
Qty: 0 | Refills: 0 | DISCHARGE

## 2021-02-02 RX ORDER — FOLIC ACID 0.8 MG
1 TABLET ORAL DAILY
Refills: 0 | Status: DISCONTINUED | OUTPATIENT
Start: 2021-02-02 | End: 2021-02-03

## 2021-02-02 RX ORDER — METOPROLOL TARTRATE 50 MG
1 TABLET ORAL
Qty: 0 | Refills: 0 | DISCHARGE

## 2021-02-02 RX ORDER — HEPARIN SODIUM 5000 [USP'U]/ML
5000 INJECTION INTRAVENOUS; SUBCUTANEOUS EVERY 12 HOURS
Refills: 0 | Status: DISCONTINUED | OUTPATIENT
Start: 2021-02-02 | End: 2021-02-03

## 2021-02-02 RX ORDER — ACETAMINOPHEN 500 MG
650 TABLET ORAL EVERY 6 HOURS
Refills: 0 | Status: DISCONTINUED | OUTPATIENT
Start: 2021-02-02 | End: 2021-02-03

## 2021-02-02 RX ADMIN — HEPARIN SODIUM 5000 UNIT(S): 5000 INJECTION INTRAVENOUS; SUBCUTANEOUS at 19:27

## 2021-02-02 RX ADMIN — TAMSULOSIN HYDROCHLORIDE 0.4 MILLIGRAM(S): 0.4 CAPSULE ORAL at 21:55

## 2021-02-02 RX ADMIN — ATORVASTATIN CALCIUM 40 MILLIGRAM(S): 80 TABLET, FILM COATED ORAL at 21:56

## 2021-02-02 RX ADMIN — Medication 5 MILLIGRAM(S): at 15:51

## 2021-02-02 RX ADMIN — SODIUM CHLORIDE 75 MILLILITER(S): 9 INJECTION, SOLUTION INTRAVENOUS at 19:27

## 2021-02-02 NOTE — BRIEF OPERATIVE NOTE - NSICDXBRIEFPROCEDURE_GEN_ALL_CORE_FT
PROCEDURES:  Cystoscopy, with TURP and TURBT 02-Feb-2021 22:47:21  Tim Callahan  TURP, electrosurgical 02-Feb-2021 22:44:24  Tim Callahan

## 2021-02-02 NOTE — ASU DISCHARGE PLAN (ADULT/PEDIATRIC) - CARE PROVIDER_API CALL
Tanner Sheehan)  Urology  [address] [place] [address], [place] [address]  Phone: [phone]  Fax: [phone]  Follow Up Time:

## 2021-02-02 NOTE — ASU DISCHARGE PLAN (ADULT/PEDIATRIC) - ASU DC SPECIAL INSTRUCTIONSFT
ACTIVITY: No heavy lifting or straining. Otherwise, you may return to your usual level of physical activity.   DIET: Return to your usual diet.  NOTIFY YOUR SURGEON IF: You may notice some blood in your urine following the procedure. This is normal and to be expected. However, if you become unable to empty your bladder or you notice large amounts of blood passing into your urine with clots, please call the office. Also call the office if you have any fever (over 100.4 F) or chills, persistent nausea/vomiting, if your catheter stops draining, or if your pain is not controlled on your discharge pain medications  FOLLOW-UP:  1. Please call to make a follow-up appointment for catheter removal in the office on Thursday, 2/4.

## 2021-02-02 NOTE — PROGRESS NOTE ADULT - SUBJECTIVE AND OBJECTIVE BOX
Subjective  Patient denies abdominal pain, nausea, vomiting    Objective    Vital signs  T(F): , Max: 98.1 (02-02-21 @ 10:43)  HR: 74 (02-02-21 @ 18:44)  BP: 144/81 (02-02-21 @ 18:44)  SpO2: 97% (02-02-21 @ 18:44)  Wt(kg): --    Output     02-02 @ 07:01  -  02-02 @ 19:46  --------------------------------------------------------  IN: 630 mL / OUT: 0 mL / NET: 630 mL        Gen: No distress observed  Abd: Soft, non-tender  : + gonzalez, punch colored urine. CBI started    Labs        Urine Cx: ?  Blood Cx: ?    Imaging

## 2021-02-02 NOTE — BRIEF OPERATIVE NOTE - OPERATION/FINDINGS
Cystoscopy performed. 2 bladder tumors noted on right bladder wall both resected. Smaller area of erythema noted on left lateral wall, resected. No lesions noted elsewhere in the bladder. Attention then turned to patient's large prostate with intravesical component. Adenoma resected and hemostasis achieved after. 22Fr Allen 3-way placed at end of case and attached to CBI.

## 2021-02-03 ENCOUNTER — TRANSCRIPTION ENCOUNTER (OUTPATIENT)
Age: 71
End: 2021-02-03

## 2021-02-03 VITALS
HEART RATE: 60 BPM | SYSTOLIC BLOOD PRESSURE: 144 MMHG | DIASTOLIC BLOOD PRESSURE: 85 MMHG | OXYGEN SATURATION: 100 % | RESPIRATION RATE: 17 BRPM | TEMPERATURE: 98 F

## 2021-02-03 DIAGNOSIS — I10 ESSENTIAL (PRIMARY) HYPERTENSION: ICD-10-CM

## 2021-02-03 DIAGNOSIS — N40.0 BENIGN PROSTATIC HYPERPLASIA WITHOUT LOWER URINARY TRACT SYMPTOMS: ICD-10-CM

## 2021-02-03 DIAGNOSIS — D49.4 NEOPLASM OF UNSPECIFIED BEHAVIOR OF BLADDER: ICD-10-CM

## 2021-02-03 DIAGNOSIS — I25.10 ATHEROSCLEROTIC HEART DISEASE OF NATIVE CORONARY ARTERY WITHOUT ANGINA PECTORIS: ICD-10-CM

## 2021-02-03 DIAGNOSIS — Z29.9 ENCOUNTER FOR PROPHYLACTIC MEASURES, UNSPECIFIED: ICD-10-CM

## 2021-02-03 PROBLEM — N39.0 URINARY TRACT INFECTION, SITE NOT SPECIFIED: Chronic | Status: ACTIVE | Noted: 2021-01-27

## 2021-02-03 LAB
ANION GAP SERPL CALC-SCNC: 9 MMOL/L — SIGNIFICANT CHANGE UP (ref 7–14)
BUN SERPL-MCNC: 17 MG/DL — SIGNIFICANT CHANGE UP (ref 7–23)
CALCIUM SERPL-MCNC: 8.8 MG/DL — SIGNIFICANT CHANGE UP (ref 8.4–10.5)
CHLORIDE SERPL-SCNC: 100 MMOL/L — SIGNIFICANT CHANGE UP (ref 98–107)
CO2 SERPL-SCNC: 26 MMOL/L — SIGNIFICANT CHANGE UP (ref 22–31)
CREAT SERPL-MCNC: 1.21 MG/DL — SIGNIFICANT CHANGE UP (ref 0.5–1.3)
GLUCOSE SERPL-MCNC: 144 MG/DL — HIGH (ref 70–99)
HCT VFR BLD CALC: 43.8 % — SIGNIFICANT CHANGE UP (ref 39–50)
HGB BLD-MCNC: 14.9 G/DL — SIGNIFICANT CHANGE UP (ref 13–17)
MCHC RBC-ENTMCNC: 30.7 PG — SIGNIFICANT CHANGE UP (ref 27–34)
MCHC RBC-ENTMCNC: 34 GM/DL — SIGNIFICANT CHANGE UP (ref 32–36)
MCV RBC AUTO: 90.3 FL — SIGNIFICANT CHANGE UP (ref 80–100)
NRBC # BLD: 0 /100 WBCS — SIGNIFICANT CHANGE UP
NRBC # FLD: 0 K/UL — SIGNIFICANT CHANGE UP
PLATELET # BLD AUTO: 156 K/UL — SIGNIFICANT CHANGE UP (ref 150–400)
POTASSIUM SERPL-MCNC: 3.6 MMOL/L — SIGNIFICANT CHANGE UP (ref 3.5–5.3)
POTASSIUM SERPL-SCNC: 3.6 MMOL/L — SIGNIFICANT CHANGE UP (ref 3.5–5.3)
RBC # BLD: 4.85 M/UL — SIGNIFICANT CHANGE UP (ref 4.2–5.8)
RBC # FLD: 13.6 % — SIGNIFICANT CHANGE UP (ref 10.3–14.5)
SODIUM SERPL-SCNC: 135 MMOL/L — SIGNIFICANT CHANGE UP (ref 135–145)
WBC # BLD: 10.92 K/UL — HIGH (ref 3.8–10.5)
WBC # FLD AUTO: 10.92 K/UL — HIGH (ref 3.8–10.5)

## 2021-02-03 PROCEDURE — 51702 INSERT TEMP BLADDER CATH: CPT

## 2021-02-03 PROCEDURE — 99222 1ST HOSP IP/OBS MODERATE 55: CPT

## 2021-02-03 RX ORDER — ASPIRIN/CALCIUM CARB/MAGNESIUM 324 MG
81 TABLET ORAL DAILY
Refills: 0 | Status: DISCONTINUED | OUTPATIENT
Start: 2021-02-03 | End: 2021-02-03

## 2021-02-03 RX ORDER — LANOLIN ALCOHOL/MO/W.PET/CERES
3 CREAM (GRAM) TOPICAL ONCE
Refills: 0 | Status: COMPLETED | OUTPATIENT
Start: 2021-02-03 | End: 2021-02-03

## 2021-02-03 RX ORDER — SODIUM CHLORIDE 9 MG/ML
1000 INJECTION, SOLUTION INTRAVENOUS
Refills: 0 | Status: DISCONTINUED | OUTPATIENT
Start: 2021-02-03 | End: 2021-02-03

## 2021-02-03 RX ADMIN — Medication 3 MILLIGRAM(S): at 00:29

## 2021-02-03 RX ADMIN — SODIUM CHLORIDE 75 MILLILITER(S): 9 INJECTION, SOLUTION INTRAVENOUS at 05:25

## 2021-02-03 RX ADMIN — SODIUM CHLORIDE 75 MILLILITER(S): 9 INJECTION, SOLUTION INTRAVENOUS at 09:35

## 2021-02-03 RX ADMIN — FINASTERIDE 5 MILLIGRAM(S): 5 TABLET, FILM COATED ORAL at 11:35

## 2021-02-03 RX ADMIN — Medication 81 MILLIGRAM(S): at 14:22

## 2021-02-03 RX ADMIN — Medication 50 MILLIGRAM(S): at 05:25

## 2021-02-03 RX ADMIN — Medication 200 MILLIGRAM(S): at 00:29

## 2021-02-03 RX ADMIN — Medication 1 MILLIGRAM(S): at 11:36

## 2021-02-03 RX ADMIN — HEPARIN SODIUM 5000 UNIT(S): 5000 INJECTION INTRAVENOUS; SUBCUTANEOUS at 04:49

## 2021-02-03 NOTE — PROGRESS NOTE ADULT - ASSESSMENT
This 69 yo M is s/p TURBT yesterday admitted for CBI; CBI off this AM, urine clear  Plan:  - d/c gonzalez  - OOB   - AM labs
s/p TURBT  -Pain management  -continue CBI  -Labs in AM  -DVT prophylaxis

## 2021-02-03 NOTE — DISCHARGE NOTE NURSING/CASE MANAGEMENT/SOCIAL WORK - NSDCPNINST_GEN_ALL_CORE
Make a follow up appointment with Dr. Sheehan. Call MD if you develop a fever, or if you have difficulty urinating, increased pain, or bloody urine. No heavy lifting or straining. Make a follow up appointment with Dr. Sheehan. Call MD if you develop a fever, or if you have  increased pain, or bloody urine. No heavy lifting or straining. Allen and leg bag care as instructed.

## 2021-02-03 NOTE — CONSULT NOTE ADULT - SUBJECTIVE AND OBJECTIVE BOX
Maria Del Carmen Ya MD  Pager [phone]    HPI:  Patient is a 70 y.o. male with h/o HTN, CAD with LAD stents x3 , last one in 2016 at [facility], BPH, found to have on MRI abdomen to have bladder tumor, admitted for cysto, TURBT on 2/2/21. Pt treated with levofloxacin 1/20/21 for urinary tract infection by Dr Mariano. He denies hematuria or flank pain.   Patient doing well, gonzalez removed, waiting to void. Denies chest pain/sob/dizziness.    PAST MEDICAL & SURGICAL HISTORY:  Enlargement (benign) of prostate    Urinary tract infection  started on antibiotic 1/20/21    Hypertension    Stented coronary artery  times 3 ; 2016 [facility]    Stented coronary artery  2016    S/P spinal fusion  1975    H/O laminectomy  no hardware 1970        Review of Systems:   CONSTITUTIONAL: No fever, weight loss, or fatigue  EYES: No eye pain, visual disturbances, or discharge  ENMT:  No difficulty hearing, tinnitus, vertigo; No sinus or throat pain  NECK: No pain or stiffness  BREASTS: No pain, masses, or nipple discharge  RESPIRATORY: No cough, wheezing, chills or hemoptysis; No shortness of breath  CARDIOVASCULAR: No chest pain, palpitations, dizziness, or leg swelling  GASTROINTESTINAL: No abdominal or epigastric pain. No nausea, vomiting, or hematemesis; No diarrhea or constipation. No melena or hematochezia.  GENITOURINARY: No dysuria, frequency, hematuria, or incontinence  NEUROLOGICAL: No headaches, memory loss, loss of strength, numbness, or tremors  SKIN: No itching, burning, rashes, or lesions   LYMPH NODES: No enlarged glands  ENDOCRINE: No heat or cold intolerance; No hair loss  MUSCULOSKELETAL: No joint pain or swelling; No muscle, back, or extremity pain  PSYCHIATRIC: No depression, anxiety, mood swings, or difficulty sleeping  HEME/LYMPH: No easy bruising, or bleeding gums  ALLERY AND IMMUNOLOGIC: No hives or eczema    Allergies    penicillins (Hives)    Intolerances    Social History: denies smoking or alcohol use     FAMILY HISTORY: noncontributory for first degree relatives      Home Medications:  aspirin 81 mg oral tablet:  (02 Feb 2021 11:01)  atorvastatin 40 mg oral tablet: 1 tab(s) orally once a day (02 Feb 2021 11:01)  finasteride 5 mg oral tablet: 1 tab(s) orally once a day (02 Feb 2021 11:01)  Flomax 0.4 mg oral capsule: 1 cap(s) orally 2 times a day (02 Feb 2021 11:01)  folic acid 1 mg oral tablet: 1 tab(s) orally once a day (02 Feb 2021 11:01)  levoFLOXacin 500 mg oral tablet: 1 tab(s) orally every 24 hours x 10 days (02 Feb 2021 11:01)  metoprolol succinate 50 mg oral tablet, extended release: 1 tab(s) orally once a day (02 Feb 2021 11:01)  Tribenzor 40 mg-5 mg-12.5 mg oral tablet: 1 tab(s) orally once a day (02 Feb 2021 11:01)      MEDICATIONS  (STANDING):  atorvastatin 40 milliGRAM(s) Oral at bedtime  ciprofloxacin   IVPB 400 milliGRAM(s) IV Intermittent every 12 hours  dextrose 5% + sodium chloride 0.45%. 1000 milliLiter(s) (75 mL/Hr) IV Continuous <Continuous>  finasteride 5 milliGRAM(s) Oral daily  folic acid 1 milliGRAM(s) Oral daily  heparin   Injectable 5000 Unit(s) SubCutaneous every 12 hours  metoprolol succinate ER 50 milliGRAM(s) Oral daily  tamsulosin 0.4 milliGRAM(s) Oral at bedtime    MEDICATIONS  (PRN):  acetaminophen   Tablet .. 650 milliGRAM(s) Oral every 6 hours PRN Mild Pain (1 - 3)      Vital Signs Last 24 Hrs  T(C): 36.6 (03 Feb 2021 08:57), Max: 36.9 (02 Feb 2021 22:53)  T(F): 97.8 (03 Feb 2021 08:57), Max: 98.5 (02 Feb 2021 22:53)  HR: 64 (03 Feb 2021 08:57) (50 - 84)  BP: 116/76 (03 Feb 2021 08:57) (116/76 - 166/93)  BP(mean): 107 (02 Feb 2021 16:15) (100 - 112)  RR: 18 (03 Feb 2021 08:57) (3 - 18)  SpO2: 96% (03 Feb 2021 08:57) (94% - 99%)  CAPILLARY BLOOD GLUCOSE        I&O's Summary    02 Feb 2021 07:01  -  03 Feb 2021 07:00  --------------------------------------------------------  IN: 630 mL / OUT: 0 mL / NET: 630 mL        PHYSICAL EXAM:  GENERAL: NAD, well-developed  HEAD:  Atraumatic, Normocephalic  EYES: EOMI, conjunctiva and sclera clear  NECK: Supple, No JVD  CHEST/LUNG: Clear to auscultation bilaterally; No wheeze  HEART: Regular rate and rhythm; No murmurs, rubs, or gallops  ABDOMEN: Soft, Nontender, Nondistended; Bowel sounds present  EXTREMITIES:  2+ Peripheral Pulses, No clubbing, cyanosis, or edema  PSYCH: AAOx3  NEUROLOGY: non-focal  SKIN: No rashes or lesions    LABS:                        14.9   10.92 )-----------( 156      ( 03 Feb 2021 07:21 )             43.8     02-03    135  |  100  |  17  ----------------------------<  144<H>  3.6   |  26  |  1.21    Ca    8.8      03 Feb 2021 07:21        Microbiology Culture Results:   No growth (01-27-21 @ 13:45)      RADIOLOGY & ADDITIONAL TESTS:    Imaging Personally Reviewed:  < from: CT Abdomen and Pelvis w/wo IV Cont (01.20.21 @ 16:21) >  IMPRESSION:  *  Enhancing right bladder wall mass in keeping with the clinical history of bladder tumor.  *  No evidence of metastatic disease or suspicious lymphadenopathy in the abdomen and pelvis.  *  Left renal cysts.      Consultant(s) Notes Reviewed:      Care Discussed with Consultants/Other Providers: urology jorgito Aguirre ASA

## 2021-02-03 NOTE — CONSULT NOTE ADULT - PROBLEM SELECTOR RECOMMENDATION 2
-h/o CAD with LAD stents x3, last one in 2016 at [facility], pt was on ASA/brillinta x1 year , now on ASA daily  -resume ASA 81 mg qd  -c/w statin, toprol 50 mg qd -h/o CAD with LAD stents x3, last one in 2016 at , pt was on ASA/brillinta x1 year , now on ASA daily  -preop stress echo normal LVEF 55-60%, normal augmentation, no ischemia  -resume ASA 81 mg qd  -c/w statin, toprol 50 mg qd

## 2021-02-03 NOTE — DISCHARGE NOTE PROVIDER - HOSPITAL COURSE
Addended by: MARY ALICE MAI on: 1/2/2017 07:20 PM     Modules accepted: Orders     Pt had TURBT yesterday; admitted for CBI; labs stable; had CBI shut off this AM, then gonzalez removed;  did not void; gonzalez re-inserted for 400 cc, at first punchy but now clear pink; home with gonzalez; f/u in office

## 2021-02-03 NOTE — DISCHARGE NOTE PROVIDER - NSDCMRMEDTOKEN_GEN_ALL_CORE_FT
aspirin 81 mg oral tablet:   atorvastatin 40 mg oral tablet: 1 tab(s) orally once a day  finasteride 5 mg oral tablet: 1 tab(s) orally once a day  Flomax 0.4 mg oral capsule: 1 cap(s) orally 2 times a day  folic acid 1 mg oral tablet: 1 tab(s) orally once a day  metoprolol succinate 50 mg oral tablet, extended release: 1 tab(s) orally once a day  Tribenzor 40 mg-5 mg-12.5 mg oral tablet: 1 tab(s) orally once a day

## 2021-02-03 NOTE — DISCHARGE NOTE PROVIDER - NSDCFUSCHEDAPPT_GEN_ALL_CORE_FT
BRICE JAIME ; 02/11/2021 ; [facility] [address] [facility]  BRICE JAIME ; 03/30/2021 ; [facility] [address] [place] [address]

## 2021-02-03 NOTE — PROGRESS NOTE ADULT - SUBJECTIVE AND OBJECTIVE BOX
POD #1  Afeb 122/85 63 96%RA    Pt has no c/o  Abd- soft NT ND   Allen/ CBI clear pink off CBI X 2 hrs

## 2021-02-03 NOTE — DISCHARGE NOTE PROVIDER - NSDCCPCAREPLAN_GEN_ALL_CORE_FT
PRINCIPAL DISCHARGE DIAGNOSIS  Diagnosis: Bladder tumor  Assessment and Plan of Treatment: Drink plenty of fluids  Allen care as instructed  Call office for fever over 101, or if urine turns dark red, even with drinking

## 2021-02-03 NOTE — CONSULT NOTE ADULT - ASSESSMENT
70 y.o. male with h/o HTN, CAD with LAD stents x3 , last one in 2016 at [facility], BPH, found to have on MRI abdomen to have bladder tumor, s/p cysto, TURBT on 2/2/21

## 2021-02-03 NOTE — DISCHARGE NOTE NURSING/CASE MANAGEMENT/SOCIAL WORK - NSDPDISTO_GEN_ALL_CORE
Home Pt. is afebrile and offers no complaints. In no acute distress. Pt is ambulating ad judi, tolerating diet well, and gonzalez patent, draining adequate amounts of urine./Home

## 2021-02-03 NOTE — DISCHARGE NOTE PROVIDER - CARE PROVIDER_API CALL
Sami Mariano)  Urology  [address] [place] [address], [place] [address]  Phone: [phone]  Fax: [phone]  Follow Up Time:

## 2021-02-03 NOTE — DISCHARGE NOTE NURSING/CASE MANAGEMENT/SOCIAL WORK - PATIENT PORTAL LINK FT
You can access the FollowMyHealth Patient Portal offered by [facility] by registering at the following website: [email or web address]. By joining [facility]’s FollowMyHealth portal, you will also be able to view your health information using other applications (apps) compatible with our system.

## 2021-02-04 LAB — SURGICAL PATHOLOGY STUDY: SIGNIFICANT CHANGE UP

## 2021-02-05 ENCOUNTER — APPOINTMENT (OUTPATIENT)
Dept: UROLOGY | Facility: CLINIC | Age: 71
End: 2021-02-05
Payer: MEDICARE

## 2021-02-05 ENCOUNTER — OUTPATIENT (OUTPATIENT)
Dept: OUTPATIENT SERVICES | Facility: HOSPITAL | Age: 71
LOS: 1 days | End: 2021-02-05
Payer: MEDICARE

## 2021-02-05 DIAGNOSIS — Z98.890 OTHER SPECIFIED POSTPROCEDURAL STATES: Chronic | ICD-10-CM

## 2021-02-05 DIAGNOSIS — Z95.5 PRESENCE OF CORONARY ANGIOPLASTY IMPLANT AND GRAFT: Chronic | ICD-10-CM

## 2021-02-05 DIAGNOSIS — Z98.1 ARTHRODESIS STATUS: Chronic | ICD-10-CM

## 2021-02-05 DIAGNOSIS — R97.20 ELEVATED PROSTATE, SPECIFIC ANTIGEN [PSA]: ICD-10-CM

## 2021-02-05 DIAGNOSIS — R33.9 RETENTION OF URINE, UNSPECIFIED: ICD-10-CM

## 2021-02-05 DIAGNOSIS — R35.0 FREQUENCY OF MICTURITION: ICD-10-CM

## 2021-02-05 PROCEDURE — 51700 IRRIGATION OF BLADDER: CPT

## 2021-02-05 PROCEDURE — 99215 OFFICE O/P EST HI 40 MIN: CPT | Mod: 25

## 2021-02-05 NOTE — ADDENDUM
[FreeTextEntry1] : This note was authored by Esperanza Evans working as a scribe for Dr.Gary Mariano. I, Dr. Sami Mariano have reviewed the content of this note and confirm it is true and accurate. I personally performed the history and physical examination and made all the decisions 02/05/2021.

## 2021-02-05 NOTE — HISTORY OF PRESENT ILLNESS
[Nocturia] : nocturia [Erectile Dysfunction] : Erectile Dysfunction [FreeTextEntry1] : 12/15/2020:70 yr male with large BPH , testicular inflammation orchitis 5/19, resolved with abx. \par Doing well on finasteride and flomax . \par Nocturia 2-3x per night \par No blood in urine \par No more testicular pain or discomfort\par Psa 5/19 4.52,  5/20 4.63\par No FHx of prostate CA\par Patient is an  and works in his own firm. \par \par 12/15/2020: The patient gave permission for a telephone visit. His PSA has been elevated and was 4.52 on 9/30/2020. He had an MRI on 12/9/2020 that showed there is an 0.8 x 0.8 cm polypoid lesion at the left bladder wall. This is consistent with bladder malignancy. 147 cc gland with mass effect on the bladder.\par No MRI suspicious prostate lesions. *PIRADS 1 - Very low (clinically significant cancer is highly unlikely to be present). Patient was never a smoker. \par \par 01/19/2021: The patient presents today for a cystoscopy for a mass found in the bladder on the MRI which also stated PIRADS-1. The patient denies hematuria. He is on a diuretic and has some urinary frequency. He reports stable urination patterns. THe patient recently had a renal sonogram due to an elevated creatinine. He reports his testicles are feeling good and no longer ache. The patient was concerned at the risk of the tumors found on cystoscopy being cancerous. \par \par 01/22/2021: The patient presents today to review the results of his most recent CT urogram. On his last visit he underwent a cystoscopy that showed right inferior bladder wall was deeply erythematous and had a carpet of papillary growth as well as two dome like papillary tumors, one larger about 2 cm in diameter and the other 0.8 cm in diameter. Both were suspicious for malignancy. I conferred with Dr.Eran Gallagher on his CT urogram from 1/20/2021. It showed enhancing right bladder wall mass in keeping with the clinical history of bladder tumor. No evidence of metastatic disease or suspicious lymphadenopathy in the abdomen and pelvis.Left renal cysts visualized. He appears good in the upper tracts. Enhancement seems confined to mucosa. No gross invasion of muscular layer. Prostate is very large. No ureteral or renal tumors in collecting system or parenchyma. Nocturia 2-3x. Denies any lower back pain. He takes aspirin every day and has 3 stents. \par \par 02/05/2021: The patient presents today for a fill and pull which was successful. He did not have any burning after. He recently underwent a TURBT with  on 2/2/2021. Pathology report from 2/2/2021 showed invasive high grade urothelial carcinoma.The carcinoma invades lamina propria. Muscularis propria is not involved. No lymphovascular invasion. Benign prostate tissue also seen.  [Urinary Retention] : no urinary retention [Urinary Urgency] : no urinary urgency [Hematuria - Gross] : no gross hematuria

## 2021-02-05 NOTE — PHYSICAL EXAM
[General Appearance - Well Developed] : well developed [General Appearance - Well Nourished] : well nourished [Normal Appearance] : normal appearance [Well Groomed] : well groomed [General Appearance - In No Acute Distress] : no acute distress [Skin Color & Pigmentation] : normal skin color and pigmentation [] : no rash [Oriented To Time, Place, And Person] : oriented to person, place, and time [Affect] : the affect was normal [Mood] : the mood was normal [Not Anxious] : not anxious

## 2021-02-10 DIAGNOSIS — N40.1 BENIGN PROSTATIC HYPERPLASIA WITH LOWER URINARY TRACT SYMPTOMS: ICD-10-CM

## 2021-02-10 DIAGNOSIS — R33.9 RETENTION OF URINE, UNSPECIFIED: ICD-10-CM

## 2021-02-10 DIAGNOSIS — R97.20 ELEVATED PROSTATE SPECIFIC ANTIGEN [PSA]: ICD-10-CM

## 2021-02-10 DIAGNOSIS — C67.8 MALIGNANT NEOPLASM OF OVERLAPPING SITES OF BLADDER: ICD-10-CM

## 2021-02-11 ENCOUNTER — APPOINTMENT (OUTPATIENT)
Dept: UROLOGY | Facility: CLINIC | Age: 71
End: 2021-02-11

## 2021-02-12 ENCOUNTER — APPOINTMENT (OUTPATIENT)
Dept: UROLOGY | Facility: CLINIC | Age: 71
End: 2021-02-12

## 2021-02-12 ENCOUNTER — APPOINTMENT (OUTPATIENT)
Dept: UROLOGY | Facility: CLINIC | Age: 71
End: 2021-02-12
Payer: MEDICARE

## 2021-02-12 VITALS
BODY MASS INDEX: 25.11 KG/M2 | HEART RATE: 90 BPM | DIASTOLIC BLOOD PRESSURE: 77 MMHG | RESPIRATION RATE: 17 BRPM | HEIGHT: 67 IN | SYSTOLIC BLOOD PRESSURE: 128 MMHG | TEMPERATURE: 97.4 F | WEIGHT: 160 LBS

## 2021-02-12 PROCEDURE — 99215 OFFICE O/P EST HI 40 MIN: CPT

## 2021-02-12 PROCEDURE — 88112 CYTOPATH CELL ENHANCE TECH: CPT | Mod: 26

## 2021-02-12 NOTE — PHYSICAL EXAM
[General Appearance - Well Developed] : well developed [General Appearance - Well Nourished] : well nourished [Normal Appearance] : normal appearance [Well Groomed] : well groomed [General Appearance - In No Acute Distress] : no acute distress [Skin Color & Pigmentation] : normal skin color and pigmentation [] : no rash [Oriented To Time, Place, And Person] : oriented to person, place, and time [Affect] : the affect was normal [Mood] : the mood was normal [Not Anxious] : not anxious [Normal Station and Gait] : the gait and station were normal for the patient's age [No Focal Deficits] : no focal deficits

## 2021-02-12 NOTE — HISTORY OF PRESENT ILLNESS
[Erectile Dysfunction] : Erectile Dysfunction [Nocturia] : nocturia [FreeTextEntry1] : 12/15/2020:70 yr male with large BPH , testicular inflammation orchitis 5/19, resolved with abx. \par Doing well on finasteride and flomax . \par Nocturia 2-3x per night \par No blood in urine \par No more testicular pain or discomfort\par Psa 5/19 4.52,  5/20 4.63\par No FHx of prostate CA\par Patient is an  and works in his own firm. \par \par 12/15/2020: The patient gave permission for a telephone visit. His PSA has been elevated and was 4.52 on 9/30/2020. He had an MRI on 12/9/2020 that showed there is an 0.8 x 0.8 cm polypoid lesion at the left bladder wall. This is consistent with bladder malignancy. 147 cc gland with mass effect on the bladder.\par No MRI suspicious prostate lesions. *PIRADS 1 - Very low (clinically significant cancer is highly unlikely to be present). Patient was never a smoker. \par \par 01/19/2021: The patient presents today for a cystoscopy for a mass found in the bladder on the MRI which also stated PIRADS-1. The patient denies hematuria. He is on a diuretic and has some urinary frequency. He reports stable urination patterns. THe patient recently had a renal sonogram due to an elevated creatinine. He reports his testicles are feeling good and no longer ache. The patient was concerned at the risk of the tumors found on cystoscopy being cancerous. \par \par 01/22/2021: The patient presents today to review the results of his most recent CT urogram. On his last visit he underwent a cystoscopy that showed right inferior bladder wall was deeply erythematous and had a carpet of papillary growth as well as two dome like papillary tumors, one larger about 2 cm in diameter and the other 0.8 cm in diameter. Both were suspicious for malignancy. I conferred with Dr.Eran Gallagher on his CT urogram from 1/20/2021. It showed enhancing right bladder wall mass in keeping with the clinical history of bladder tumor. No evidence of metastatic disease or suspicious lymphadenopathy in the abdomen and pelvis.Left renal cysts visualized. He appears good in the upper tracts. Enhancement seems confined to mucosa. No gross invasion of muscular layer. Prostate is very large. No ureteral or renal tumors in collecting system or parenchyma. Nocturia 2-3x. Denies any lower back pain. He takes aspirin every day and has 3 stents. \par \par 02/05/2021: The patient presents today for a fill and pull which was successful. He did not have any burning after. He recently underwent a TURBT with  on 2/2/2021. Pathology report from 2/2/2021 showed invasive high grade urothelial carcinoma.The carcinoma invades lamina propria. Muscularis propria is not involved. No lymphovascular invasion. Benign prostate tissue also seen. \par \par 02/12/2021: The patient presents today for a follow up. He reports gradual improvement overall. He is 10 days post op. Pt reports there is less blood when he urinates. He describes it as a little spout of blood when he first begins to void but then the rest of his stream is clear. He still feels some pressure when voiding but the discomfort has subsided. He denies any burning. He would like to put off the first BCG treatment for one more week. He reports a fever of 100.6 degrees Fahrenheit  last night that was relieved with Tylenol. Appetite is good and bowel movements are good. He originally had some constipation right after the procedure but that has resolved.  [Urinary Retention] : no urinary retention [Urinary Urgency] : no urinary urgency [Hematuria - Gross] : no gross hematuria

## 2021-02-12 NOTE — ADDENDUM
[FreeTextEntry1] : This note was authored by Esperanza Evans working as a scribe for Dr.Gary Mariano. I, Dr. Sami Mariano have reviewed the content of this note and confirm it is true and accurate. I personally performed the history and physical examination and made all the decisions 02/12/2021.

## 2021-02-12 NOTE — REVIEW OF SYSTEMS
[Dark Urine] : dark urine [Feeling Poorly] : not feeling poorly [Constipation] : no constipation [Diarrhea] : no diarrhea [Dysuria] : no dysuria [Burning Sensation] : no burning sensation during urination [Initiating Urination Req. Strain] : initiating urination does not require straining [Difficulty Walking] : no difficulty walking

## 2021-02-17 ENCOUNTER — APPOINTMENT (OUTPATIENT)
Dept: UROLOGY | Facility: CLINIC | Age: 71
End: 2021-02-17

## 2021-02-17 ENCOUNTER — APPOINTMENT (OUTPATIENT)
Dept: OTOLARYNGOLOGY | Facility: CLINIC | Age: 71
End: 2021-02-17
Payer: MEDICARE

## 2021-02-17 VITALS
HEART RATE: 54 BPM | DIASTOLIC BLOOD PRESSURE: 89 MMHG | SYSTOLIC BLOOD PRESSURE: 151 MMHG | TEMPERATURE: 97.2 F | WEIGHT: 160 LBS | BODY MASS INDEX: 25.11 KG/M2 | HEIGHT: 67 IN

## 2021-02-17 DIAGNOSIS — H91.90 UNSPECIFIED HEARING LOSS, UNSPECIFIED EAR: ICD-10-CM

## 2021-02-17 DIAGNOSIS — H61.22 IMPACTED CERUMEN, LEFT EAR: ICD-10-CM

## 2021-02-17 PROCEDURE — G0268 REMOVAL OF IMPACTED WAX MD: CPT

## 2021-02-17 PROCEDURE — 99214 OFFICE O/P EST MOD 30 MIN: CPT | Mod: 25

## 2021-02-17 PROCEDURE — 31575 DIAGNOSTIC LARYNGOSCOPY: CPT

## 2021-02-17 PROCEDURE — 92567 TYMPANOMETRY: CPT

## 2021-02-17 PROCEDURE — 92557 COMPREHENSIVE HEARING TEST: CPT

## 2021-02-17 RX ORDER — CIPROFLOXACIN HYDROCHLORIDE 500 MG/1
500 TABLET, FILM COATED ORAL
Qty: 20 | Refills: 0 | Status: DISCONTINUED | COMMUNITY
Start: 2019-09-09 | End: 2021-02-17

## 2021-02-17 RX ORDER — OLMESARTAN MEDOXOMIL / AMLODIPINE BESYLATE / HYDROCHLOROTHIAZIDE 10; 25; 40 MG/1; MG/1; MG/1
TABLET, FILM COATED ORAL
Refills: 0 | Status: ACTIVE | COMMUNITY

## 2021-02-17 RX ORDER — LEVOFLOXACIN 500 MG/1
500 TABLET, FILM COATED ORAL DAILY
Qty: 10 | Refills: 0 | Status: DISCONTINUED | COMMUNITY
Start: 2021-01-20 | End: 2021-02-17

## 2021-02-17 NOTE — REVIEW OF SYSTEMS
[Post Nasal Drip] : post nasal drip [Hoarseness] : hoarseness [Throat Clearing] : throat clearing [Negative] : Heme/Lymph

## 2021-02-19 ENCOUNTER — OUTPATIENT (OUTPATIENT)
Dept: OUTPATIENT SERVICES | Facility: HOSPITAL | Age: 71
LOS: 1 days | End: 2021-02-19
Payer: MEDICARE

## 2021-02-19 ENCOUNTER — RESULT REVIEW (OUTPATIENT)
Age: 71
End: 2021-02-19

## 2021-02-19 ENCOUNTER — APPOINTMENT (OUTPATIENT)
Dept: ULTRASOUND IMAGING | Facility: IMAGING CENTER | Age: 71
End: 2021-02-19
Payer: MEDICARE

## 2021-02-19 DIAGNOSIS — Z98.890 OTHER SPECIFIED POSTPROCEDURAL STATES: Chronic | ICD-10-CM

## 2021-02-19 DIAGNOSIS — E04.1 NONTOXIC SINGLE THYROID NODULE: ICD-10-CM

## 2021-02-19 DIAGNOSIS — Z95.5 PRESENCE OF CORONARY ANGIOPLASTY IMPLANT AND GRAFT: Chronic | ICD-10-CM

## 2021-02-19 DIAGNOSIS — Z98.1 ARTHRODESIS STATUS: Chronic | ICD-10-CM

## 2021-02-19 PROCEDURE — 76536 US EXAM OF HEAD AND NECK: CPT | Mod: 26

## 2021-02-19 PROCEDURE — 76536 US EXAM OF HEAD AND NECK: CPT

## 2021-02-21 LAB
APPEARANCE: ABNORMAL
BACTERIA UR CULT: ABNORMAL
BACTERIA: ABNORMAL
BILIRUBIN URINE: NEGATIVE
BLOOD URINE: ABNORMAL
COLOR: ABNORMAL
GLUCOSE QUALITATIVE U: NEGATIVE
HYALINE CASTS: 0 /LPF
KETONES URINE: NORMAL
LEUKOCYTE ESTERASE URINE: ABNORMAL
MICROSCOPIC-UA: NORMAL
NITRITE URINE: POSITIVE
PH URINE: 6
PROTEIN URINE: ABNORMAL
RED BLOOD CELLS URINE: >720 /HPF
SPECIFIC GRAVITY URINE: 1.03
SQUAMOUS EPITHELIAL CELLS: 4 /HPF
URINE CYTOLOGY: NORMAL
UROBILINOGEN URINE: NORMAL
WHITE BLOOD CELLS URINE: 435 /HPF

## 2021-02-22 ENCOUNTER — NON-APPOINTMENT (OUTPATIENT)
Age: 71
End: 2021-02-22

## 2021-02-22 NOTE — ASSESSMENT
[FreeTextEntry1] : cerumen impaction removed left ear. dead skin and wax removed right ear. flex laryngoscopy pooling left pyriform and post cricoid which explains throat clearing. polypoid lesion floppy right cord. right thyroid nodule. plan feest and videostrobe and thyroid sono. \par Pt with hearing loss despite removal of cerumen. audio confirms bilateral high freq loss. needs amplification.

## 2021-02-22 NOTE — PROCEDURE
[Hoarseness] : hoarseness not clearly evaluated by indirect laryngoscopy [None] : none [Flexible Endoscope] : examined with the flexible endoscope [Pyriform Sinus Pooling Of Saliva Right] : pyriform sinus saliva pooling on the left [Normal] : the epiglottis was regular without inflammation, lesions or masses, with regular aryepiglottic folds, and a smooth petiolus [Glottis Edema] : ~M edematous on the right [___] : [unfilled]Ucm polyp/cyst on the right [True Vocal Cords Erythematous] : true vocal cord edema on the right [Glottis Arytenoid Cartilages Erythema] : bilateral arytenoid ~M erythema [Interarytenoid Edema] : interarytenoid area edematous [Present] : absent [de-identified] : post cricoid and and left pyriform pooling

## 2021-02-22 NOTE — HISTORY OF PRESENT ILLNESS
[de-identified] : Pt with intermittent hoarseness for last 3 months. constant throat clearing. hx of reflux 20 years ago. no symptoms now. no post nasal drip. no dysphagia or shortness of breath. recent diagnosis of bladder cancer. non smoker, non drinker.

## 2021-02-22 NOTE — PHYSICAL EXAM
[] : septum deviated bilaterally [Midline] : trachea located in midline position [Laryngoscopy Performed] : laryngoscopy was performed, see procedure section for findings [Normal] : orientation to person, place, and time: normal [FreeTextEntry1] : constant throat clearing. hoarseness and raspiness [de-identified] : right thyroid nodule [de-identified] : cerumen impaction left. dry skin and wax right

## 2021-02-22 NOTE — CONSULT LETTER
[Dear  ___] : Dear  [unfilled], [Courtesy Letter:] : I had the pleasure of seeing your patient, [unfilled], in my office today. [Please see my note below.] : Please see my note below. [Consult Closing:] : Thank you very much for allowing me to participate in the care of this patient.  If you have any questions, please do not hesitate to contact me. [Sincerely,] : Sincerely, [FreeTextEntry3] : Dragan Bell MD FACS

## 2021-02-22 NOTE — REASON FOR VISIT
[Subsequent Evaluation] : a subsequent evaluation for [Hoarseness/Dysphonia] : hoarseness [Gastroesophageal Reflux] : gastroesophageal reflux [FreeTextEntry2] : hoarseness

## 2021-02-24 ENCOUNTER — APPOINTMENT (OUTPATIENT)
Dept: UROLOGY | Facility: CLINIC | Age: 71
End: 2021-02-24

## 2021-02-26 ENCOUNTER — APPOINTMENT (OUTPATIENT)
Dept: OTOLARYNGOLOGY | Facility: CLINIC | Age: 71
End: 2021-02-26
Payer: MEDICARE

## 2021-02-26 PROCEDURE — 31579 LARYNGOSCOPY TELESCOPIC: CPT

## 2021-02-26 PROCEDURE — 92612 ENDOSCOPY SWALLOW (FEES) VID: CPT | Mod: GN

## 2021-03-02 ENCOUNTER — APPOINTMENT (OUTPATIENT)
Dept: UROLOGY | Facility: CLINIC | Age: 71
End: 2021-03-02

## 2021-03-02 ENCOUNTER — NON-APPOINTMENT (OUTPATIENT)
Age: 71
End: 2021-03-02

## 2021-03-04 ENCOUNTER — APPOINTMENT (OUTPATIENT)
Dept: UROLOGY | Facility: CLINIC | Age: 71
End: 2021-03-04

## 2021-03-10 ENCOUNTER — APPOINTMENT (OUTPATIENT)
Dept: UROLOGY | Facility: CLINIC | Age: 71
End: 2021-03-10

## 2021-03-10 ENCOUNTER — APPOINTMENT (OUTPATIENT)
Dept: OTOLARYNGOLOGY | Facility: CLINIC | Age: 71
End: 2021-03-10
Payer: MEDICARE

## 2021-03-10 VITALS
DIASTOLIC BLOOD PRESSURE: 70 MMHG | BODY MASS INDEX: 24.8 KG/M2 | HEIGHT: 67 IN | WEIGHT: 158 LBS | TEMPERATURE: 97.6 F | SYSTOLIC BLOOD PRESSURE: 122 MMHG | HEART RATE: 53 BPM

## 2021-03-10 DIAGNOSIS — K21.9 GASTRO-ESOPHAGEAL REFLUX DISEASE W/OUT ESOPHAGITIS: ICD-10-CM

## 2021-03-10 DIAGNOSIS — R13.10 DYSPHAGIA, UNSPECIFIED: ICD-10-CM

## 2021-03-10 DIAGNOSIS — R49.9 UNSPECIFIED VOICE AND RESONANCE DISORDER: ICD-10-CM

## 2021-03-10 PROCEDURE — 99214 OFFICE O/P EST MOD 30 MIN: CPT

## 2021-03-10 RX ORDER — OMEPRAZOLE 40 MG/1
40 CAPSULE, DELAYED RELEASE ORAL
Qty: 90 | Refills: 3 | Status: ACTIVE | COMMUNITY
Start: 2021-03-10 | End: 1900-01-01

## 2021-03-10 RX ORDER — DOXYCYCLINE HYCLATE 100 MG/1
100 CAPSULE ORAL
Qty: 28 | Refills: 0 | Status: COMPLETED | COMMUNITY
Start: 2021-02-21 | End: 2021-03-10

## 2021-03-10 NOTE — ADDENDUM
[FreeTextEntry1] : Documented by Linh Hernandez acting as scribe for Dr. Bell on 03/10/2021.\par \par All Medical record entries made by the Scribe were at my, Dr. Bell, direction and personally dictated by me on 03/10/2021 . I have reviewed the chart and agree that the record accurately reflects my personal performance of the history, physical exam, assessment and plan. I have also personally directed, reviewed, and agreed with the discharge instructions.

## 2021-03-10 NOTE — HISTORY OF PRESENT ILLNESS
[de-identified] : The patient presents with h/o hoarseness and throat clearing. He presents today for results. He feels his hoarseness has improvement but he is still getting mucus in his throat. h/o bladder CA tx with excision, starting BCG tx. h/o cardiac stents.

## 2021-03-10 NOTE — ASSESSMENT
[FreeTextEntry1] : Reviewed and reconciled medications, allergies, PMHx, PSHx, SocHx, FMHx.\par \par h/o cardiac stents, bladder CA tx with excision, starting BCG tx, hoarseness, throat clearing\par \par US thyroid 2/19/21: normal\par \par FEEST 2/26/21: pooled secretions, evidence of reflux, return of material to the cricopharyngeus\par \par videostrobe 2/26/21: mild edema and moderate erythema, pooled secretions at the vallecula, pyriforms and postcricoid which doesn’t go away with swallowing, mild thinning of the left VC, questionable polyp posterior 1/3 of right VC, only seen during vibration not inspiration or expiration, incomplete closure, anterior gap, moderate hyperfunction, muscle tension dysphonia\par \par Plan:\par Reviewed FEEST, videostrobe and US results. Discussed r/b/a of speech therapy. Upper GI endoscopy. Reflux diet- no eating 2 hours before bed. Omeprazole QAM 30 minutes after other medications and 30 minutes before morning meal. FU after seeing GI.

## 2021-03-11 DIAGNOSIS — Z78.9 OTHER SPECIFIED HEALTH STATUS: ICD-10-CM

## 2021-03-11 DIAGNOSIS — Z82.49 FAMILY HISTORY OF ISCHEMIC HEART DISEASE AND OTHER DISEASES OF THE CIRCULATORY SYSTEM: ICD-10-CM

## 2021-03-11 DIAGNOSIS — Z82.79 FAMILY HISTORY OF OTHER CONGENITAL MALFORMATIONS, DEFORMATIONS AND CHROMOSOMAL ABNORMALITIES: ICD-10-CM

## 2021-03-12 ENCOUNTER — NON-APPOINTMENT (OUTPATIENT)
Age: 71
End: 2021-03-12

## 2021-03-13 LAB
APPEARANCE: ABNORMAL
BACTERIA UR CULT: NORMAL
BACTERIA: NEGATIVE
BILIRUBIN URINE: NEGATIVE
BLOOD URINE: ABNORMAL
COLOR: NORMAL
GLUCOSE QUALITATIVE U: NEGATIVE
HYALINE CASTS: 1 /LPF
KETONES URINE: NEGATIVE
LEUKOCYTE ESTERASE URINE: ABNORMAL
MICROSCOPIC-UA: NORMAL
NITRITE URINE: NEGATIVE
PH URINE: 7
PROTEIN URINE: ABNORMAL
RED BLOOD CELLS URINE: 153 /HPF
SPECIFIC GRAVITY URINE: 1.02
SQUAMOUS EPITHELIAL CELLS: 5 /HPF
UROBILINOGEN URINE: NORMAL
WHITE BLOOD CELLS URINE: 137 /HPF

## 2021-03-15 ENCOUNTER — APPOINTMENT (OUTPATIENT)
Dept: GASTROENTEROLOGY | Facility: CLINIC | Age: 71
End: 2021-03-15
Payer: MEDICARE

## 2021-03-15 VITALS
SYSTOLIC BLOOD PRESSURE: 131 MMHG | TEMPERATURE: 97.5 F | BODY MASS INDEX: 25.11 KG/M2 | HEIGHT: 67 IN | DIASTOLIC BLOOD PRESSURE: 74 MMHG | WEIGHT: 160 LBS | HEART RATE: 55 BPM

## 2021-03-15 DIAGNOSIS — K21.9 GASTRO-ESOPHAGEAL REFLUX DISEASE W/OUT ESOPHAGITIS: ICD-10-CM

## 2021-03-15 DIAGNOSIS — E78.00 PURE HYPERCHOLESTEROLEMIA, UNSPECIFIED: ICD-10-CM

## 2021-03-15 DIAGNOSIS — R68.89 OTHER GENERAL SYMPTOMS AND SIGNS: ICD-10-CM

## 2021-03-15 DIAGNOSIS — K57.30 DIVERTICULOSIS OF LARGE INTESTINE W/OUT PERFORATION OR ABSCESS W/OUT BLEEDING: ICD-10-CM

## 2021-03-15 DIAGNOSIS — I25.10 ATHEROSCLEROTIC HEART DISEASE OF NATIVE CORONARY ARTERY W/OUT ANGINA PECTORIS: ICD-10-CM

## 2021-03-15 DIAGNOSIS — I10 ESSENTIAL (PRIMARY) HYPERTENSION: ICD-10-CM

## 2021-03-15 PROCEDURE — 82274 ASSAY TEST FOR BLOOD FECAL: CPT | Mod: QW

## 2021-03-15 PROCEDURE — 99214 OFFICE O/P EST MOD 30 MIN: CPT

## 2021-03-15 NOTE — PHYSICAL EXAM
[General Appearance - Alert] : alert [General Appearance - In No Acute Distress] : in no acute distress [General Appearance - Well Nourished] : well nourished [General Appearance - Well Developed] : well developed [General Appearance - Well-Appearing] : healthy appearing [Sclera] : the sclera and conjunctiva were normal [PERRL With Normal Accommodation] : pupils were equal in size, round, and reactive to light [Extraocular Movements] : extraocular movements were intact [Strabismus] : no strabismus was seen [Outer Ear] : the ears and nose were normal in appearance [Examination Of The Oral Cavity] : the lips and gums were normal [Both Tympanic Membranes Were Examined] : both tympanic membranes were normal [Nasal Cavity] : the nasal mucosa and septum were normal [Oropharynx] : the oropharynx was normal [Neck Appearance] : the appearance of the neck was normal [Neck Cervical Mass (___cm)] : no neck mass was observed [Jugular Venous Distention Increased] : there was no jugular-venous distention [Thyroid Diffuse Enlargement] : the thyroid was not enlarged [Thyroid Nodule] : there were no palpable thyroid nodules [Auscultation Breath Sounds / Voice Sounds] : lungs were clear to auscultation bilaterally [Lungs Percussion] : the lungs were normal to percussion [Heart Rate And Rhythm] : heart rate was normal and rhythm regular [Heart Sounds] : normal S1 and S2 [Heart Sounds Gallop] : no gallops [Murmurs] : no murmurs [Heart Sounds Pericardial Friction Rub] : no pericardial rub [Arterial Pulses Carotid] : carotid pulses were normal with no bruits [Abdominal Aorta] : the abdominal aorta was normal [Full Pulse] : the pedal pulses are present [Edema] : there was no peripheral edema [Veins - Varicosity Changes] : there were no varicosital changes [Bowel Sounds] : normal bowel sounds [Abdomen Soft] : soft [Abdomen Tenderness] : non-tender [Abdomen Mass (___ Cm)] : no abdominal mass palpated [Normal Sphincter Tone] : normal sphincter tone [No Rectal Mass] : no rectal mass [Occult Blood Positive] : stool was negative for occult blood [FreeTextEntry1] : Stool brown, negative Hemoccult, negative iFOBT [Penis Abnormality] : the penis was normal [Scrotum] : the scrotum was normal [Testes Swelling] : the testicles were not swollen [Testes Mass (___cm)] : there were no testicular masses [Cervical Lymph Nodes Enlarged Posterior Bilaterally] : posterior cervical [Cervical Lymph Nodes Enlarged Anterior Bilaterally] : anterior cervical [Supraclavicular Lymph Nodes Enlarged Bilaterally] : supraclavicular [Axillary Lymph Nodes Enlarged Bilaterally] : axillary [Femoral Lymph Nodes Enlarged Bilaterally] : femoral [Inguinal Lymph Nodes Enlarged Bilaterally] : inguinal [No CVA Tenderness] : no ~M costovertebral angle tenderness [No Spinal Tenderness] : no spinal tenderness [Abnormal Walk] : normal gait [Nail Clubbing] : no clubbing  or cyanosis of the fingernails [Musculoskeletal - Swelling] : no joint swelling seen [Motor Tone] : muscle strength and tone were normal [Skin Color & Pigmentation] : normal skin color and pigmentation [Skin Turgor] : normal skin turgor [] : no rash [Skin Lesions] : no skin lesions [No Focal Deficits] : no focal deficits [Oriented To Time, Place, And Person] : oriented to person, place, and time [Impaired Insight] : insight and judgment were intact [Affect] : the affect was normal [Mood] : the mood was normal

## 2021-03-15 NOTE — HISTORY OF PRESENT ILLNESS
[FreeTextEntry1] : Corey referred back to our office from ENT because of complaints of constant clearing of his throat, excessive mucus or phlegm and hoarseness.  A recent ENT exam by Dr. Bell revealed a vocal cord nodule.  However, when the patient had a FEESST test, the nodule was not seen, but it did reveal reflux.  He was started on omeprazole 40 mg 3 days ago, but it is too soon to see if he had a good response.  He was recently diagnosed with bladder cancer, and he is starting BCG treatments this week.  He also recently was treated for a urinary tract infection.  Since he was last seen, because of a positive cardiac stress test, 2 stents were inserted.  A recent stress test and CT angiogram were negative.

## 2021-03-15 NOTE — ASSESSMENT
[FreeTextEntry1] : 1.  Constant throat clearing, hoarseness and increased mucus-rule out secondary to GERD-upper endoscopy August 27, 2012 revealed biopsy positive for mild esophagitis.  Rule out Garay's esophagus, rule out esophageal lesion.\par 2.  History of colonic polyps-colonoscopy April 6, 2015 revealed diverticulosis in the descending colon, transverse colon and ascending colon.\par 3.  Coronary artery disease-status post 2 coronary artery stents; recent negative stress test and negative CT angiogram.\par 4.  Bladder cancer-to start CG treatments.\par 5.  Pretension.\par 6.  Hypercholesterolemia.\par 7.  Elevated PSA-status post partial TURP at the time of bladder surgery.\par 8.  Status post lumbar laminectomy and spinal fusion.\par \par Plan:\par 1.  The patient was advised to schedule an upper endoscopy.  The procedure, material risks (including bleeding, perforation, anesthesia-related complications, rare complications, death and risk of missing a lesion), benefits (including finding an ulcer, esophagitis,  infection, celiac disease, cancer or other lesions) and alternatives (including imaging and stool testing) were discussed with the patient at length.  The [employer] Brochure was given.\par 2.  The patient wishes to wait a few months before he schedules his colonoscopy.\par 3.  Obtain recent blood test results from Dr. Salina Marie.

## 2021-03-15 NOTE — CONSULT LETTER
[Dear  ___] : Dear  [unfilled], [Consult Letter:] : I had the pleasure of evaluating your patient, [unfilled]. [( Thank you for referring [unfilled] for consultation for _____ )] : Thank you for referring [unfilled] for consultation for [unfilled] [Please see my note below.] : Please see my note below. [Sincerely,] : Sincerely, [FreeTextEntry3] : Ryan Bartlett MD

## 2021-03-16 ENCOUNTER — NON-APPOINTMENT (OUTPATIENT)
Age: 71
End: 2021-03-16

## 2021-03-17 ENCOUNTER — APPOINTMENT (OUTPATIENT)
Dept: UROLOGY | Facility: CLINIC | Age: 71
End: 2021-03-17

## 2021-03-18 ENCOUNTER — APPOINTMENT (OUTPATIENT)
Dept: UROLOGY | Facility: CLINIC | Age: 71
End: 2021-03-18
Payer: MEDICARE

## 2021-03-18 ENCOUNTER — OUTPATIENT (OUTPATIENT)
Dept: OUTPATIENT SERVICES | Facility: HOSPITAL | Age: 71
LOS: 1 days | End: 2021-03-18
Payer: MEDICARE

## 2021-03-18 VITALS
DIASTOLIC BLOOD PRESSURE: 81 MMHG | HEART RATE: 58 BPM | SYSTOLIC BLOOD PRESSURE: 133 MMHG | TEMPERATURE: 97.6 F | RESPIRATION RATE: 16 BRPM

## 2021-03-18 VITALS — SYSTOLIC BLOOD PRESSURE: 147 MMHG | HEART RATE: 74 BPM | DIASTOLIC BLOOD PRESSURE: 82 MMHG

## 2021-03-18 DIAGNOSIS — R35.0 FREQUENCY OF MICTURITION: ICD-10-CM

## 2021-03-18 DIAGNOSIS — Z98.1 ARTHRODESIS STATUS: Chronic | ICD-10-CM

## 2021-03-18 DIAGNOSIS — Z95.5 PRESENCE OF CORONARY ANGIOPLASTY IMPLANT AND GRAFT: Chronic | ICD-10-CM

## 2021-03-18 DIAGNOSIS — Z98.890 OTHER SPECIFIED POSTPROCEDURAL STATES: Chronic | ICD-10-CM

## 2021-03-18 PROCEDURE — 51720 TREATMENT OF BLADDER LESION: CPT

## 2021-03-18 RX ADMIN — BACILLUS CALMETTE-GUERIN 0 MG: 50 POWDER, FOR SUSPENSION INTRAVESICAL at 00:00

## 2021-03-24 ENCOUNTER — APPOINTMENT (OUTPATIENT)
Dept: UROLOGY | Facility: CLINIC | Age: 71
End: 2021-03-24

## 2021-03-24 ENCOUNTER — NON-APPOINTMENT (OUTPATIENT)
Age: 71
End: 2021-03-24

## 2021-03-24 DIAGNOSIS — C67.8 MALIGNANT NEOPLASM OF OVERLAPPING SITES OF BLADDER: ICD-10-CM

## 2021-03-25 ENCOUNTER — APPOINTMENT (OUTPATIENT)
Dept: UROLOGY | Facility: CLINIC | Age: 71
End: 2021-03-25

## 2021-03-26 ENCOUNTER — APPOINTMENT (OUTPATIENT)
Dept: DISASTER EMERGENCY | Facility: CLINIC | Age: 71
End: 2021-03-26

## 2021-03-27 LAB — SARS-COV-2 N GENE NPH QL NAA+PROBE: NOT DETECTED

## 2021-03-29 ENCOUNTER — APPOINTMENT (OUTPATIENT)
Dept: GASTROENTEROLOGY | Facility: CLINIC | Age: 71
End: 2021-03-29
Payer: MEDICARE

## 2021-03-29 ENCOUNTER — LABORATORY RESULT (OUTPATIENT)
Age: 71
End: 2021-03-29

## 2021-03-29 PROCEDURE — 43239 EGD BIOPSY SINGLE/MULTIPLE: CPT

## 2021-03-30 ENCOUNTER — APPOINTMENT (OUTPATIENT)
Dept: UROLOGY | Facility: CLINIC | Age: 71
End: 2021-03-30
Payer: MEDICARE

## 2021-03-30 PROCEDURE — 99214 OFFICE O/P EST MOD 30 MIN: CPT

## 2021-03-30 PROCEDURE — 88112 CYTOPATH CELL ENHANCE TECH: CPT | Mod: 26

## 2021-03-30 NOTE — HISTORY OF PRESENT ILLNESS
[Nocturia] : nocturia [Erectile Dysfunction] : Erectile Dysfunction [FreeTextEntry1] : 12/15/2020:70 yr male with large BPH , testicular inflammation orchitis 5/19, resolved with abx. \par Doing well on finasteride and flomax . \par Nocturia 2-3x per night \par No blood in urine \par No more testicular pain or discomfort\par Psa 5/19 4.52,  5/20 4.63\par No FHx of prostate CA\par Patient is an  and works in his own firm. \par \par 12/15/2020: The patient gave permission for a telephone visit. His PSA has been elevated and was 4.52 on 9/30/2020. He had an MRI on 12/9/2020 that showed there is an 0.8 x 0.8 cm polypoid lesion at the left bladder wall. This is consistent with bladder malignancy. 147 cc gland with mass effect on the bladder.\par No MRI suspicious prostate lesions. *PIRADS 1 - Very low (clinically significant cancer is highly unlikely to be present). Patient was never a smoker. \par \par 01/19/2021: The patient presents today for a cystoscopy for a mass found in the bladder on the MRI which also stated PIRADS-1. The patient denies hematuria. He is on a diuretic and has some urinary frequency. He reports stable urination patterns. THe patient recently had a renal sonogram due to an elevated creatinine. He reports his testicles are feeling good and no longer ache. The patient was concerned at the risk of the tumors found on cystoscopy being cancerous. \par \par 01/22/2021: The patient presents today to review the results of his most recent CT urogram. On his last visit he underwent a cystoscopy that showed right inferior bladder wall was deeply erythematous and had a carpet of papillary growth as well as two dome like papillary tumors, one larger about 2 cm in diameter and the other 0.8 cm in diameter. Both were suspicious for malignancy. I conferred with Dr.Eran Gallagher on his CT urogram from 1/20/2021. It showed enhancing right bladder wall mass in keeping with the clinical history of bladder tumor. No evidence of metastatic disease or suspicious lymphadenopathy in the abdomen and pelvis.Left renal cysts visualized. He appears good in the upper tracts. Enhancement seems confined to mucosa. No gross invasion of muscular layer. Prostate is very large. No ureteral or renal tumors in collecting system or parenchyma. Nocturia 2-3x. Denies any lower back pain. He takes aspirin every day and has 3 stents. \par \par 02/05/2021: The patient presents today for a fill and pull which was successful. He did not have any burning after. He recently underwent a TURBT with  on 2/2/2021. Pathology report from 2/2/2021 showed invasive high grade urothelial carcinoma.The carcinoma invades lamina propria. Muscularis propria is not involved. No lymphovascular invasion. Benign prostate tissue also seen. \par \par 02/12/2021: The patient presents today for a follow up. He reports gradual improvement overall. He is 10 days post op. Pt reports there is less blood when he urinates. He describes it as a little spout of blood when he first begins to void but then the rest of his stream is clear. He still feels some pressure when voiding but the discomfort has subsided. He denies any burning. He would like to put off the first BCG treatment for one more week. He reports a fever of 100.6 degrees Fahrenheit  last night that was relieved with Tylenol. Appetite is good and bowel movements are good. He originally had some constipation right after the procedure but that has resolved. \par \par 03/30/2021: Patient presents today for a follow up. His urine is currently perfectly clear. He is not on any aspirin or blood thinners. He took his first BCG treatment two weeks ago. He retained in his bladder for two hours. He had no adverse side effects. He stopped taking aspirin night before and two nights after the treatment. He has stents. He then started the aspirin two nights after and bled profusely, describing it at the deepest, darkest red he's ever seen. When he stopped taking the aspirin it started to clearer a little bit each day. Pt has drank a lot of water. He spoke with his cardiologist and he does not want him to be off the blood thinner completely.  [Urinary Retention] : no urinary retention [Urinary Urgency] : no urinary urgency [Hematuria - Gross] : no gross hematuria

## 2021-03-30 NOTE — REVIEW OF SYSTEMS
[Constipation] : constipation [Heartburn] : heartburn [Urine Is Cloudy] : cloudy urine [Feeling Poorly] : not feeling poorly [Difficulty Walking] : no difficulty walking

## 2021-03-30 NOTE — ASSESSMENT
[FreeTextEntry1] : 12/15/2020: 70 yr male with BPH, pSA elevation , LUTs nocturia .  Acute left orchitis May 2019 resolved. \par Following for pSA elevation to rule out prostatic neoplasm\par \par patient concerned for prostate cancer preventive surveillance .  Asking if he was due for another MRI prostate.  Last MRI prostate 2016\par Knee chest position was used for digital rectal exam. No suspicious rectal masses. No rectal mucosal lesions. Anal tone is normal. The prostate is non tender, with normal texture, discrete borders, and no nodules. It is a 45 gram transurethral resection size. Prostate is wide. No gross blood on examining fingers. \par Repeat prostate mri\par PSA\par Alkaline phosphate\par UA micros\par Doing well on finasteride and flomax.  Has retrograde ejaculation but tolerable symptoms per patient. \par Continue finasteride \par Continue flomax \par \par 12/15/2020: The patient gave permission for a telephone visit. His PSA has been elevated and was 4.52 on 9/30/2020. He had an MRI on 12/9/2020 that showed there is an 0.8 x 0.8 cm polypoid lesion at the left bladder wall. This is consistent with bladder malignancy. 147 cc gland with mass effect on the bladder.\par No MRI suspicious prostate lesions. *PIRADS 1 - Very low (clinically significant cancer is highly unlikely to be present). Patient was never a smoker. \par \par I discussed the results of the patient's MRI with him. \par The patient will undergo a cystoscopy for the mass found in the bladder on the MRI. Dictation states left bladder wall mass but images show right 0.8 x 0.8 cm bladder mass. Radiology notified of discrepancy. I advised the patient to eat on the day of the procedure and can drive themselves if they like. I informed the patient we will insert lidocaine for local anesthesia. I discussed the risk of infection, but informed the patient that we will provide an antibiotic after the procedure and at bedtime. \par \par 01/19/2021: The patient presents today for a cystoscopy for a mass found in the bladder on the MRI which also stated PIRADS-1. The patient denies hematuria. He is on a diuretic and has some urinary frequency. He reports stable urination patterns. THe patient recently had a renal sonogram due to an elevated creatinine. He reports his testicles are feeling good and no longer ache. The patient was concerned at the risk of the tumors found on cystoscopy being cancerous. \par \par Lidocaine jelly was injected for lubrication and numbing. Had no urethral tumors, strictures or stones. Lateral lobe hypertrophy of the prostate. No bladder stones. 2+ trabeculated bladder. Right inferior bladder wall was deeply erythematous and had a carpet of papillary growth as well as two dome like papillary tumors, one larger about 2 cm in diameter and the other 0.8 cm in diameter. Both are suspicious for malignancy. The patient took one Bactrim tablet at the time of the procedure and will take one before bed. \par \par I informed him that it is about an 80% chance of the bladder tumors being cancer. \par I recommended shaving out the bladder tumors to send them for pathology. I explained to him the procedure process, risks and preventative measures after the procedure including medications. I informed him about the options he has if it is high grade. I am recommending him to  for the procedure who will further discuss it with him. \par \par I am sending the pt for a CT urogram since I explained to him that 5% of people who have these tumors in the bladder also have it in the upper tract. The patient will have his recent blood work faxed over. \par The patient will RTO after the CT urogram. \par \par 01/22/2021: The patient presents today to review the results of his most recent CT urogram. On his last visit he underwent a cystoscopy that showed right inferior bladder wall was deeply erythematous and had a carpet of papillary growth as well as two dome like papillary tumors, one larger about 2 cm in diameter and the other 0.8 cm in diameter. Both were suspicious for malignancy. I conferred with Dr.Eran Gallagher on his CT urogram from 1/20/2021. It showed enhancing right bladder wall mass in keeping with the clinical history of bladder tumor. No evidence of metastatic disease or suspicious lymphadenopathy in the abdomen and pelvis.Left renal cysts visualized. He appears good in the upper tracts. Enhancement seems confined to mucosa. No gross invasion of muscular layer. Prostate is very large. No ureteral or renal tumors in collecting system or parenchyma. Nocturia 2-3x. Denies any lower back pain. He takes aspirin every day and has 3 stents. \par \par I informed him he can stay on the aspirin for the procedure. \par The pt will undergo a TURBT with  and is scheduled for it on 2/2/2021. \par \par 02/05/2021: The patient presents today for a fill and pull which was successful. He did not have any burning after. He recently underwent a TURBT with  on 2/2/2021. Pathology report from 2/2/2021 showed invasive high grade urothelial carcinoma.The carcinoma invades lamina propria. Muscularis propria is not involved. No lymphovascular invasion. Benign prostate tissue also seen. \par \par Advised to drink a lot of fluids. \par He was informed to avoid sexual activity and any strenuous activity including lifting heavy objects. \par He will continue finasteride 5 mg once daily. \par I recommended the patient BCG treatments once a week for 6 weeks followed by cystoscopies every 3 months. I informed the patient that the injection may cause systemic illness resembling TB, urgency or frequency of urination, orchitis from BCG.\par The patient will start BCG treatments on 2/17/2021. If he is still experiencing some bleeding he will postpone for one more week.\par \par 02/12/2021: The patient presents today for a follow up. He reports gradual improvement overall. He is 10 days post op. Pt reports there is less blood when he urinates. He describes it as a little spout of blood when he first begins to void but then the rest of his stream is clear. He still feels some pressure when voiding but the discomfort has subsided. He denies any burning. He would like to put off the first BCG treatment for one more week. He reports a fever of 100.6 degrees Fahrenheit  last night that was relieved with Tylenol. Appetite is good and bowel movements are good. He originally had some constipation right after the procedure but that has resolved. \par \par I informed him that it would be a good idea to put off the BCG treatment for another 2 weeks since he is still bleeding. I explained to the pt that his dark urine is most likely from the prostate still bleeding and that it should resolve soon. A small portion of his prostate was removed so access to the tumor was easier. \par I informed the pt that his fever could be from possible atelectasis resolving but its also possible that it was a transient infection. The patient produced a urine sample which will be sent for urinalysis, urine cytology, and urine culture. Urine specimen was grossly bloody and opaque in high blood content. \par Informed the pt to continue to avoid strenuous activities. \par He is on aspirin that I instructed him to continue. \par We had a lengthy discussion about the course his disease might take as well as managing it. We discussed inter vesical therapy and maintenance. We discussed cystoscopic surveillance as well as upper tract imaging. We discussed potential for muscle invasive disease in the future. Spoke about considerations such as chemotherapy and cystectomy as well as radiation with radiation sensitizers. We also spoke about potential urinary diversions if cystectomy was performed. \par The pt will RTO in 2 weeks for start of BCG treatments so long as he is not bleeding anymore. \par \par 03/30/2021: Patient presents today for a follow up. His urine is currently perfectly clear. He is not on any aspirin or blood thinners. He took his first BCG treatment two weeks ago. He retained in his bladder for two hours. He had no adverse side effects. He stopped taking aspirin night before and two nights after the treatment. He has stents. He then started the aspirin two nights after and bled profusely, describing it at the deepest, darkest red he's ever seen. When he stopped taking the aspirin it started to clearer a little bit each day. Pt has drank a lot of water. He spoke with his cardiologist and he does not want him to be off the blood thinner completely. \par \par My recommendation was that we stop BCG treatments until his next cystoscopy due to the risk of clotting without taking the aspirin and taking aspirin encouraging the bleeding. Patient would like to continue with the BCG treatments, despite discussing issues of staying off aspirin causing possible myocardial infarction, however we will proceed cautiously and will have a BCG treatment on Thursday. He will not take the aspirin before Thursday and he can proceed to take it after two days of clear urine after treatment. \par \par I informed the patient that the blood thinner is not causing the bleeding but making the bleeding worse. I informed him he needs to avoid any straining to prevent bleeding. I advised him to take 2 stool softeners twice daily. I informed him that the more treatments he gets, the more irritated the bladder gets.\par \par Prior MRI's and imaging reviewed with the patient and his wife. \par \par The patient produced a urine sample which will be sent for urinalysis, urine cytology, and urine culture. If his urinalysis shows any sign of infection, he will not undergo a BCG treatment this week. The BCG treatment does cause pyuria. \par \par Patient will RTO Thursday for BCG treatment. \par \par Preparation, in person office visit, and coordination of care: 30 minutes.

## 2021-03-30 NOTE — ADDENDUM
[FreeTextEntry1] : This note was authored by Esperanza Evans working as a scribe for Dr.Gary Mariano. I, Dr. Sami Mariano have reviewed the content of this note and confirm it is true and accurate. I personally performed the history and physical examination and made all the decisions 03/30/2021.

## 2021-03-31 ENCOUNTER — APPOINTMENT (OUTPATIENT)
Dept: UROLOGY | Facility: CLINIC | Age: 71
End: 2021-03-31

## 2021-04-01 ENCOUNTER — APPOINTMENT (OUTPATIENT)
Dept: UROLOGY | Facility: CLINIC | Age: 71
End: 2021-04-01

## 2021-04-01 ENCOUNTER — NON-APPOINTMENT (OUTPATIENT)
Age: 71
End: 2021-04-01

## 2021-04-02 ENCOUNTER — NON-APPOINTMENT (OUTPATIENT)
Age: 71
End: 2021-04-02

## 2021-04-05 ENCOUNTER — NON-APPOINTMENT (OUTPATIENT)
Age: 71
End: 2021-04-05

## 2021-04-05 LAB
APPEARANCE: ABNORMAL
BACTERIA UR CULT: ABNORMAL
BACTERIA: ABNORMAL
BILIRUBIN URINE: NEGATIVE
BLOOD URINE: ABNORMAL
COLOR: NORMAL
GLUCOSE QUALITATIVE U: NEGATIVE
HYALINE CASTS: 2 /LPF
KETONES URINE: NEGATIVE
LEUKOCYTE ESTERASE URINE: ABNORMAL
MICROSCOPIC-UA: NORMAL
NITRITE URINE: NEGATIVE
PH URINE: 6
PROTEIN URINE: ABNORMAL
RED BLOOD CELLS URINE: 29 /HPF
SPECIFIC GRAVITY URINE: 1.02
SQUAMOUS EPITHELIAL CELLS: 2 /HPF
URINE CYTOLOGY: NORMAL
UROBILINOGEN URINE: NORMAL
WHITE BLOOD CELLS URINE: 414 /HPF

## 2021-04-06 NOTE — ADDENDUM
[facility] Progress Note    Emi Saha is a 79 y.o. female patient. Hospitalization Day:2      Consult reason:     Pancreatic mass and colitis     Subjective:    21  Pt examined at bedside  S/P EGD and EUS with Post pancreatic body lesion fine needle biopsy performed. No new complaints aprat from mild tenderness and sore throat from recent procedure  Pending biopsy report  Hemodynamically stable  Resumed on eliquis. On cipro and metronidazole      VITALS:  BP (!) 145/85   Pulse 71   Temp 98.7 °F (37.1 °C) (Oral)   Resp 16   Ht 4' 11\" (1.499 m)   Wt 94 lb 8 oz (42.9 kg)   LMP 2015   SpO2 95%   BMI 19.09 kg/m²   TEMPERATURE:  Current - Temp: 98.7 °F (37.1 °C); Max - Temp  Av.2 °F (36.8 °C)  Min: 97.8 °F (36.6 °C)  Max: 98.7 °F (37.1 °C)    Physical Assessment:    General appearance:  alert, cooperative and no distress  Mental Status:  oriented to person, place and time and normal affect  Lungs:  clear to auscultation bilaterally, normal effort  Heart:  regular rate and rhythm, no murmur  Abdomen:  soft, nontender, nondistended, normal bowel sounds, no masses, hepatomegaly, splenomegaly  Extremities:  no edema, redness, tenderness in the calves  Skin:  warm, dry, no gross lesions or rashes    Data Review:  LABS and IMAGING:     CBC  Recent Labs     21  1933 21  1906 21  0837   WBC 7.0 4.6 4.8   HGB 17.5* 11.9 12.0   HCT 52.2* 35.5* 36.9   MCV 97.2 97.3 99.5   MCHC 33.5 33.5 32.5   RDW 14.5* 14.2 13.8   PLT See Reflexed IPF Result 410 Platelet clumps present, count appears adequate. Immature PLTs   Lab Results   Component Value Date    PLTFLUORE 393 2021    PLTFLUORE 1,426 2019    PLTFLUORE 1,494 2019       ANEMIA STUDIES  No results for input(s): LABIRON, TIBC, IRON, FERRITIN, QMNZOZOW25, FOLATE, OCCULTBLD in the last 72 hours.     BMP  Recent Labs     21  1933 21  1906 21  0837   * 137 137   K 4.8 3.3* 4.5 [FreeTextEntry1] : This note was authored by Esperanza Evans working as a scribe for Dr.Gary Mariano. I, Dr. Sami Mariano have reviewed the content of this note and confirm it is true and accurate. I personally performed the history and physical examination and made all the decisions 01/22/2021.  CL 96* 106 109*   CO2 18* 19* 19*   BUN 12 20 12   CREATININE 0.56 0.64 0.54   GLUCOSE 91 82 87   CALCIUM 10.6* 8.4* 8.7       LFTS  Recent Labs     04/03/21 1933 04/04/21  1906 04/06/21  0837   ALKPHOS 111* 80 69   ALT 51* 61* 37*   AST 66* 92* 52*   BILITOT 0.84 1.26* 0.88   BILIDIR  --   --  0.10   LABALBU 5.1 3.6 3.3*  3.3*       AMYLASE/LIPASE/AMMONIA  Recent Labs     04/03/21 1933   LIPASE 44       Acute Hepatitis Panel   Lab Results   Component Value Date    HEPBSAG NONREACTIVE 06/23/2017    HEPCAB REACTIVE 10/09/2017    HEPBIGM NONREACTIVE 09/14/2015    HEPAIGM NONREACTIVE 09/14/2015       HCV Genotype   No results found for: HEPATITISCGENOTYPE    HCV Quantitative   No results found for: HCVQNT    LIVER WORK UP:    AFP  No results found for: AFP    Alpha 1 antitrypsin   No results found for: A1A    Anti - Liver/Kidney Ab  No results found for: LIVER-KIDNEYMICROSOMALAB    BARBARA  Lab Results   Component Value Date    BARBARA NEGATIVE 02/12/2018       AMA  No results found for: Troy Brit    ASMA  No results found for: SMOOTHMUSCAB    Ceruloplasmin  No results found for: CERULOPLSM    Celiac panel  No results found for: TISSTRNTIIGG, TTGIGA, IGA    IgG  No results found for: IGG    IgM  No results found for: IGM    GGT   Lab Results   Component Value Date    LABGGT 281 01/27/2017       PT/INR  Recent Labs     04/05/21  0632   PROTIME 10.9   INR 1.0       Cancer Markers:  CEA:  No results for input(s): CEA in the last 72 hours. Ca 125:  No results for input(s):  in the last 72 hours. Ca 19-9:   No results for input(s):  in the last 72 hours. AFP: No results for input(s): AFP in the last 72 hours. Lactic acid:No results for input(s): LACTACIDWB in the last 72 hours.       Radiology Review:    Ct Abdomen Pelvis W Iv Contrast Additional Contrast? None    Result Date: 4/3/2021  Thickening of the wall of the ascending colon with mild surrounding fatty stranding is likely suggestive of infectious/inflammatory colitis. Apparent hyperenhancement of the mucosa of the stomach. Finding is nonspecific but may be suggestive of gastritis. 2.3 x 1.9 cm peripherally enhancing lesion appears to arise from the mid body of the pancreas. The peripheral aspect of this lesion has a more solid appearance in comparison to prior exam. There is also associated mild dilatation of pancreatic duct. The findings can be suggestive of intrapancreatic most mucinous  neoplasm. For further evaluation of this finding contrast enhance MRCP examination is recommended. Severe degenerative changes of the left hip joint with bone-on-bone appearance. Endoscopy: 04/05/21     EGD Findings[de-identified]   Esophagus: normal.   Stomach: Stomach had small sliding hiatal hernia. Stomach was otherwise normal.  Duodenum: normal     EUS findings:  Pancreas: There was a lesion near the pancreatic body measuring 23 mm x 18 mm. This had well-defined borders. There was a small calcifications within the lesion. This was solid-appearing. This was hypoechoic. Fine-needle biopsy of this lesion was performed via transgastric route with 22-gauge fine-needle biopsy needle. 4 passes were made. Preliminary cytology showed adequate cellularity. The rest of the pancreas otherwise appeared normal. Normal PD. No signs of chronic pancreatitis. CBD: Normal, no stones, sludge. CBD diameter: 5 mm. Left adrenal: Normal.   Ampulla: Normal.  Left lobe of liver: normal.   Lymphadenopathy: No pathologic lymphadenopathy seen in upper abdomen    Principal Problem:    Pancreatic mass  Active Problems:    Essential hypertension    Right flank pain    COPD without exacerbation (HCC)    History of hepatitis C    Chronic diastolic heart failure (HCC)    Homelessness    Colitis  Resolved Problems:    * No resolved hospital problems.  *       GI Assessment:    Pancreatic cyst/ mass  COPD  Hepatitis C  Essential Hypertension  Hx of peptic ulcer disease  Hx of PE on eliquis    Plan of care:    Regular diet. Okay to discharge from GI standpoint with follow-up in GI office. Can continue Eliquis. Continue acid suppression on discharge for Hx of PUD. Thank you for consult   Call GI for further questiones  GI service will sign off    This plan was formulated in collaboration with Dr. Suzan Werner  Please feel free to contact me with any questions or concerns. Thank you for allowing me to participate in the care of your patient. Henry Garcia MD  Internal Medicine Resident, [facility];  [place], [place]  4/6/2021, 11:46 AM

## 2021-04-07 ENCOUNTER — APPOINTMENT (OUTPATIENT)
Dept: UROLOGY | Facility: CLINIC | Age: 71
End: 2021-04-07

## 2021-04-08 ENCOUNTER — APPOINTMENT (OUTPATIENT)
Dept: UROLOGY | Facility: CLINIC | Age: 71
End: 2021-04-08

## 2021-04-15 ENCOUNTER — APPOINTMENT (OUTPATIENT)
Dept: UROLOGY | Facility: CLINIC | Age: 71
End: 2021-04-15

## 2021-04-22 ENCOUNTER — APPOINTMENT (OUTPATIENT)
Dept: UROLOGY | Facility: CLINIC | Age: 71
End: 2021-04-22
Payer: MEDICARE

## 2021-04-22 ENCOUNTER — OUTPATIENT (OUTPATIENT)
Dept: OUTPATIENT SERVICES | Facility: HOSPITAL | Age: 71
LOS: 1 days | End: 2021-04-22
Payer: MEDICARE

## 2021-04-22 VITALS — DIASTOLIC BLOOD PRESSURE: 83 MMHG | TEMPERATURE: 98 F | HEART RATE: 46 BPM | SYSTOLIC BLOOD PRESSURE: 150 MMHG

## 2021-04-22 DIAGNOSIS — Z98.890 OTHER SPECIFIED POSTPROCEDURAL STATES: Chronic | ICD-10-CM

## 2021-04-22 DIAGNOSIS — R97.20 ELEVATED PROSTATE SPECIFIC ANTIGEN [PSA]: ICD-10-CM

## 2021-04-22 DIAGNOSIS — Z95.5 PRESENCE OF CORONARY ANGIOPLASTY IMPLANT AND GRAFT: Chronic | ICD-10-CM

## 2021-04-22 DIAGNOSIS — C67.9 MALIGNANT NEOPLASM OF BLADDER, UNSPECIFIED: ICD-10-CM

## 2021-04-22 DIAGNOSIS — R35.0 FREQUENCY OF MICTURITION: ICD-10-CM

## 2021-04-22 DIAGNOSIS — Z98.1 ARTHRODESIS STATUS: Chronic | ICD-10-CM

## 2021-04-22 DIAGNOSIS — N40.1 BENIGN PROSTATIC HYPERPLASIA WITH LOWER URINARY TRACT SYMPTOMS: ICD-10-CM

## 2021-04-22 DIAGNOSIS — R31.29 OTHER MICROSCOPIC HEMATURIA: ICD-10-CM

## 2021-04-22 LAB
APPEARANCE: ABNORMAL
BACTERIA UR CULT: NORMAL
BACTERIA: NEGATIVE
BILIRUBIN URINE: NEGATIVE
BLOOD URINE: ABNORMAL
COLOR: YELLOW
GLUCOSE QUALITATIVE U: NEGATIVE
HYALINE CASTS: 0 /LPF
KETONES URINE: NEGATIVE
LEUKOCYTE ESTERASE URINE: ABNORMAL
MICROSCOPIC-UA: NORMAL
NITRITE URINE: NEGATIVE
PH URINE: 5.5
PROTEIN URINE: ABNORMAL
RED BLOOD CELLS URINE: 16 /HPF
SPECIFIC GRAVITY URINE: 1.02
SQUAMOUS EPITHELIAL CELLS: 1 /HPF
UROBILINOGEN URINE: NORMAL
WHITE BLOOD CELLS URINE: 153 /HPF

## 2021-04-22 PROCEDURE — 51720 TREATMENT OF BLADDER LESION: CPT

## 2021-04-22 RX ORDER — BACILLUS CALMETTE-GUERIN 50 MG/50ML
50 POWDER, FOR SUSPENSION INTRAVESICAL
Qty: 1 | Refills: 0 | Status: COMPLETED | OUTPATIENT
Start: 2021-03-18 | End: 2021-04-22

## 2021-04-22 RX ADMIN — BACILLUS CALMETTE-GUERIN 0 MG: 50 POWDER, FOR SUSPENSION INTRAVESICAL at 00:00

## 2021-04-29 ENCOUNTER — APPOINTMENT (OUTPATIENT)
Dept: UROLOGY | Facility: CLINIC | Age: 71
End: 2021-04-29
Payer: MEDICARE

## 2021-04-29 ENCOUNTER — OUTPATIENT (OUTPATIENT)
Dept: OUTPATIENT SERVICES | Facility: HOSPITAL | Age: 71
LOS: 1 days | End: 2021-04-29
Payer: MEDICARE

## 2021-04-29 VITALS
SYSTOLIC BLOOD PRESSURE: 140 MMHG | DIASTOLIC BLOOD PRESSURE: 87 MMHG | TEMPERATURE: 97.5 F | HEART RATE: 57 BPM | RESPIRATION RATE: 16 BRPM

## 2021-04-29 DIAGNOSIS — Z98.1 ARTHRODESIS STATUS: Chronic | ICD-10-CM

## 2021-04-29 DIAGNOSIS — C67.9 MALIGNANT NEOPLASM OF BLADDER, UNSPECIFIED: ICD-10-CM

## 2021-04-29 DIAGNOSIS — N40.1 BENIGN PROSTATIC HYPERPLASIA WITH LOWER URINARY TRACT SYMPTOMS: ICD-10-CM

## 2021-04-29 DIAGNOSIS — Z98.890 OTHER SPECIFIED POSTPROCEDURAL STATES: Chronic | ICD-10-CM

## 2021-04-29 DIAGNOSIS — R35.0 FREQUENCY OF MICTURITION: ICD-10-CM

## 2021-04-29 DIAGNOSIS — Z95.5 PRESENCE OF CORONARY ANGIOPLASTY IMPLANT AND GRAFT: Chronic | ICD-10-CM

## 2021-04-29 PROCEDURE — 51720 TREATMENT OF BLADDER LESION: CPT

## 2021-04-29 RX ORDER — BACILLUS CALMETTE-GUERIN 50 MG/50ML
50 POWDER, FOR SUSPENSION INTRAVESICAL
Qty: 1 | Refills: 0 | Status: COMPLETED | OUTPATIENT
Start: 2021-04-29 | End: 2021-04-29

## 2021-04-29 RX ADMIN — BACILLUS CALMETTE-GUERIN 0 MG: 50 POWDER, FOR SUSPENSION INTRAVESICAL at 00:00

## 2021-05-06 ENCOUNTER — APPOINTMENT (OUTPATIENT)
Dept: UROLOGY | Facility: CLINIC | Age: 71
End: 2021-05-06

## 2021-05-06 ENCOUNTER — OUTPATIENT (OUTPATIENT)
Dept: OUTPATIENT SERVICES | Facility: HOSPITAL | Age: 71
LOS: 1 days | End: 2021-05-06
Payer: MEDICARE

## 2021-05-06 ENCOUNTER — APPOINTMENT (OUTPATIENT)
Dept: UROLOGY | Facility: CLINIC | Age: 71
End: 2021-05-06
Payer: MEDICARE

## 2021-05-06 VITALS
SYSTOLIC BLOOD PRESSURE: 133 MMHG | TEMPERATURE: 97.2 F | RESPIRATION RATE: 16 BRPM | DIASTOLIC BLOOD PRESSURE: 81 MMHG | HEART RATE: 59 BPM

## 2021-05-06 DIAGNOSIS — Z95.5 PRESENCE OF CORONARY ANGIOPLASTY IMPLANT AND GRAFT: Chronic | ICD-10-CM

## 2021-05-06 DIAGNOSIS — R35.0 FREQUENCY OF MICTURITION: ICD-10-CM

## 2021-05-06 DIAGNOSIS — Z98.1 ARTHRODESIS STATUS: Chronic | ICD-10-CM

## 2021-05-06 DIAGNOSIS — Z98.890 OTHER SPECIFIED POSTPROCEDURAL STATES: Chronic | ICD-10-CM

## 2021-05-06 DIAGNOSIS — C67.9 MALIGNANT NEOPLASM OF BLADDER, UNSPECIFIED: ICD-10-CM

## 2021-05-06 PROCEDURE — 51720 TREATMENT OF BLADDER LESION: CPT

## 2021-05-06 RX ADMIN — BACILLUS CALMETTE-GUERIN 0 MG: 50 POWDER, FOR SUSPENSION INTRAVESICAL at 00:00

## 2021-05-13 ENCOUNTER — APPOINTMENT (OUTPATIENT)
Dept: UROLOGY | Facility: CLINIC | Age: 71
End: 2021-05-13
Payer: MEDICARE

## 2021-05-13 ENCOUNTER — OUTPATIENT (OUTPATIENT)
Dept: OUTPATIENT SERVICES | Facility: HOSPITAL | Age: 71
LOS: 1 days | End: 2021-05-13
Payer: MEDICARE

## 2021-05-13 VITALS
TEMPERATURE: 97.2 F | HEART RATE: 60 BPM | SYSTOLIC BLOOD PRESSURE: 127 MMHG | RESPIRATION RATE: 16 BRPM | DIASTOLIC BLOOD PRESSURE: 78 MMHG

## 2021-05-13 DIAGNOSIS — R35.0 FREQUENCY OF MICTURITION: ICD-10-CM

## 2021-05-13 DIAGNOSIS — Z95.5 PRESENCE OF CORONARY ANGIOPLASTY IMPLANT AND GRAFT: Chronic | ICD-10-CM

## 2021-05-13 DIAGNOSIS — Z98.890 OTHER SPECIFIED POSTPROCEDURAL STATES: Chronic | ICD-10-CM

## 2021-05-13 DIAGNOSIS — Z98.1 ARTHRODESIS STATUS: Chronic | ICD-10-CM

## 2021-05-13 DIAGNOSIS — C67.9 MALIGNANT NEOPLASM OF BLADDER, UNSPECIFIED: ICD-10-CM

## 2021-05-13 PROCEDURE — 51720 TREATMENT OF BLADDER LESION: CPT

## 2021-05-13 RX ADMIN — BACILLUS CALMETTE-GUERIN 0 MG: 50 POWDER, FOR SUSPENSION INTRAVESICAL at 00:00

## 2021-05-19 PROCEDURE — 88112 CYTOPATH CELL ENHANCE TECH: CPT | Mod: 26

## 2021-05-20 ENCOUNTER — APPOINTMENT (OUTPATIENT)
Dept: UROLOGY | Facility: CLINIC | Age: 71
End: 2021-05-20
Payer: MEDICARE

## 2021-05-20 ENCOUNTER — OUTPATIENT (OUTPATIENT)
Dept: OUTPATIENT SERVICES | Facility: HOSPITAL | Age: 71
LOS: 1 days | End: 2021-05-20
Payer: MEDICARE

## 2021-05-20 VITALS — TEMPERATURE: 98 F | DIASTOLIC BLOOD PRESSURE: 89 MMHG | SYSTOLIC BLOOD PRESSURE: 154 MMHG | HEART RATE: 47 BPM

## 2021-05-20 DIAGNOSIS — Z95.5 PRESENCE OF CORONARY ANGIOPLASTY IMPLANT AND GRAFT: ICD-10-CM

## 2021-05-20 DIAGNOSIS — Z98.890 OTHER SPECIFIED POSTPROCEDURAL STATES: Chronic | ICD-10-CM

## 2021-05-20 DIAGNOSIS — R31.29 OTHER MICROSCOPIC HEMATURIA: ICD-10-CM

## 2021-05-20 DIAGNOSIS — Z95.5 PRESENCE OF CORONARY ANGIOPLASTY IMPLANT AND GRAFT: Chronic | ICD-10-CM

## 2021-05-20 DIAGNOSIS — N40.1 BENIGN PROSTATIC HYPERPLASIA WITH LOWER URINARY TRACT SYMPTOMS: ICD-10-CM

## 2021-05-20 DIAGNOSIS — C67.9 MALIGNANT NEOPLASM OF BLADDER, UNSPECIFIED: ICD-10-CM

## 2021-05-20 DIAGNOSIS — R35.0 FREQUENCY OF MICTURITION: ICD-10-CM

## 2021-05-20 DIAGNOSIS — Z98.1 ARTHRODESIS STATUS: Chronic | ICD-10-CM

## 2021-05-20 DIAGNOSIS — R97.20 ELEVATED PROSTATE SPECIFIC ANTIGEN [PSA]: ICD-10-CM

## 2021-05-20 PROCEDURE — 52281 CYSTOSCOPY AND TREATMENT: CPT

## 2021-05-20 RX ORDER — BACILLUS CALMETTE-GUERIN 50 MG/50ML
50 POWDER, FOR SUSPENSION INTRAVESICAL
Qty: 1 | Refills: 0 | Status: COMPLETED | OUTPATIENT
Start: 2021-05-06 | End: 2021-05-20

## 2021-05-20 RX ADMIN — BACILLUS CALMETTE-GUERIN 0 MG: 50 POWDER, FOR SUSPENSION INTRAVESICAL at 00:00

## 2021-05-22 PROBLEM — Z95.5 STATUS POST CORONARY ARTERY STENT PLACEMENT: Status: ACTIVE | Noted: 2021-03-11

## 2021-05-22 LAB
APPEARANCE: CLEAR
BACTERIA: NEGATIVE
BILIRUBIN URINE: NEGATIVE
BLOOD URINE: NEGATIVE
COLOR: NORMAL
GLUCOSE QUALITATIVE U: NEGATIVE
HYALINE CASTS: 0 /LPF
KETONES URINE: NEGATIVE
LEUKOCYTE ESTERASE URINE: ABNORMAL
MICROSCOPIC-UA: NORMAL
NITRITE URINE: NEGATIVE
PH URINE: 6
PROTEIN URINE: NORMAL
RED BLOOD CELLS URINE: 1 /HPF
SPECIFIC GRAVITY URINE: 1.02
SQUAMOUS EPITHELIAL CELLS: 1 /HPF
URINE CYTOLOGY: NORMAL
UROBILINOGEN URINE: NORMAL
WHITE BLOOD CELLS URINE: 64 /HPF

## 2021-05-23 NOTE — ASSESSMENT
[FreeTextEntry1] : 12/15/2020: 70 yr male with BPH, pSA elevation , LUTs nocturia .  Acute left orchitis May 2019 resolved. \par Following for pSA elevation to rule out prostatic neoplasm\par \par patient concerned for prostate cancer preventive surveillance .  Asking if he was due for another MRI prostate.  Last MRI prostate 2016\par Knee chest position was used for digital rectal exam. No suspicious rectal masses. No rectal mucosal lesions. Anal tone is normal. The prostate is non tender, with normal texture, discrete borders, and no nodules. It is a 45 gram transurethral resection size. Prostate is wide. No gross blood on examining fingers. \par Repeat prostate mri\par PSA\par Alkaline phosphate\par UA micros\par Doing well on finasteride and flomax.  Has retrograde ejaculation but tolerable symptoms per patient. \par Continue finasteride \par Continue flomax \par \par 12/15/2020: The patient gave permission for a telephone visit. His PSA has been elevated and was 4.52 on 9/30/2020. He had an MRI on 12/9/2020 that showed there is an 0.8 x 0.8 cm polypoid lesion at the left bladder wall. This is consistent with bladder malignancy. 147 cc gland with mass effect on the bladder.\par No MRI suspicious prostate lesions. *PIRADS 1 - Very low (clinically significant cancer is highly unlikely to be present). Patient was never a smoker. \par \par I discussed the results of the patient's MRI with him. \par The patient will undergo a cystoscopy for the mass found in the bladder on the MRI. Dictation states left bladder wall mass but images show right 0.8 x 0.8 cm bladder mass. Radiology notified of discrepancy. I advised the patient to eat on the day of the procedure and can drive themselves if they like. I informed the patient we will insert lidocaine for local anesthesia. I discussed the risk of infection, but informed the patient that we will provide an antibiotic after the procedure and at bedtime. \par \par 01/19/2021: The patient presents today for a cystoscopy for a mass found in the bladder on the MRI which also stated PIRADS-1. The patient denies hematuria. He is on a diuretic and has some urinary frequency. He reports stable urination patterns. THe patient recently had a renal sonogram due to an elevated creatinine. He reports his testicles are feeling good and no longer ache. The patient was concerned at the risk of the tumors found on cystoscopy being cancerous. \par \par Lidocaine jelly was injected for lubrication and numbing. Had no urethral tumors, strictures or stones. Lateral lobe hypertrophy of the prostate. No bladder stones. 2+ trabeculated bladder. Right inferior bladder wall was deeply erythematous and had a carpet of papillary growth as well as two dome like papillary tumors, one larger about 2 cm in diameter and the other 0.8 cm in diameter. Both are suspicious for malignancy. The patient took one Bactrim tablet at the time of the procedure and will take one before bed. \par \par I informed him that it is about an 80% chance of the bladder tumors being cancer. \par I recommended shaving out the bladder tumors to send them for pathology. I explained to him the procedure process, risks and preventative measures after the procedure including medications. I informed him about the options he has if it is high grade. I am recommending him to  for the procedure who will further discuss it with him. \par \par I am sending the pt for a CT urogram since I explained to him that 5% of people who have these tumors in the bladder also have it in the upper tract. The patient will have his recent blood work faxed over. \par The patient will RTO after the CT urogram. \par \par 01/22/2021: The patient presents today to review the results of his most recent CT urogram. On his last visit he underwent a cystoscopy that showed right inferior bladder wall was deeply erythematous and had a carpet of papillary growth as well as two dome like papillary tumors, one larger about 2 cm in diameter and the other 0.8 cm in diameter. Both were suspicious for malignancy. I conferred with Dr.Eran Gallagher on his CT urogram from 1/20/2021. It showed enhancing right bladder wall mass in keeping with the clinical history of bladder tumor. No evidence of metastatic disease or suspicious lymphadenopathy in the abdomen and pelvis.Left renal cysts visualized. He appears good in the upper tracts. Enhancement seems confined to mucosa. No gross invasion of muscular layer. Prostate is very large. No ureteral or renal tumors in collecting system or parenchyma. Nocturia 2-3x. Denies any lower back pain. He takes aspirin every day and has 3 stents. \par \par I informed him he can stay on the aspirin for the procedure. \par The pt will undergo a TURBT with  and is scheduled for it on 2/2/2021. \par \par 02/05/2021: The patient presents today for a fill and pull which was successful. He did not have any burning after. He recently underwent a TURBT with  on 2/2/2021. Pathology report from 2/2/2021 showed invasive high grade urothelial carcinoma.The carcinoma invades lamina propria. Muscularis propria is not involved. No lymphovascular invasion. Benign prostate tissue also seen. \par \par Advised to drink a lot of fluids. \par \par He was informed to avoid sexual activity and any strenuous activity including lifting heavy objects. \par \par He will continue finasteride 5 mg once daily. \par \par I recommended the patient BCG treatments once a week for 6 weeks followed by cystoscopies every 3 months. I informed the patient that the injection may cause systemic illness resembling TB, urgency or frequency of urination, orchitis from BCG.\par \par The patient will start BCG treatments on 2/17/2021. If he is still experiencing some bleeding he will postpone for one more week.\par \par Preparation, in person office visit, and coordination of care: 45 minutes. 
Stable

## 2021-05-26 ENCOUNTER — NON-APPOINTMENT (OUTPATIENT)
Age: 71
End: 2021-05-26

## 2021-06-07 ENCOUNTER — APPOINTMENT (OUTPATIENT)
Dept: UROLOGY | Facility: CLINIC | Age: 71
End: 2021-06-07
Payer: MEDICARE

## 2021-06-07 VITALS
TEMPERATURE: 97 F | RESPIRATION RATE: 16 BRPM | HEART RATE: 54 BPM | WEIGHT: 160 LBS | HEIGHT: 67 IN | DIASTOLIC BLOOD PRESSURE: 84 MMHG | SYSTOLIC BLOOD PRESSURE: 138 MMHG | BODY MASS INDEX: 25.11 KG/M2

## 2021-06-07 PROCEDURE — 88112 CYTOPATH CELL ENHANCE TECH: CPT | Mod: 26

## 2021-06-07 PROCEDURE — 99214 OFFICE O/P EST MOD 30 MIN: CPT

## 2021-06-08 NOTE — HISTORY OF PRESENT ILLNESS
[Nocturia] : nocturia [Erectile Dysfunction] : Erectile Dysfunction [FreeTextEntry1] : 12/15/2020:70 yr male with large BPH , testicular inflammation orchitis 5/19, resolved with abx. \par Doing well on finasteride and flomax . \par Nocturia 2-3x per night \par No blood in urine \par No more testicular pain or discomfort\par Psa 5/19 4.52,  5/20 4.63\par No FHx of prostate CA\par Patient is an  and works in his own firm. \par \par 12/15/2020: The patient gave permission for a telephone visit. His PSA has been elevated and was 4.52 on 9/30/2020. He had an MRI on 12/9/2020 that showed there is an 0.8 x 0.8 cm polypoid lesion at the left bladder wall. This is consistent with bladder malignancy. 147 cc gland with mass effect on the bladder.\par No MRI suspicious prostate lesions. *PIRADS 1 - Very low (clinically significant cancer is highly unlikely to be present). Patient was never a smoker. \par \par 01/19/2021: The patient presents today for a cystoscopy for a mass found in the bladder on the MRI which also stated PIRADS-1. The patient denies hematuria. He is on a diuretic and has some urinary frequency. He reports stable urination patterns. THe patient recently had a renal sonogram due to an elevated creatinine. He reports his testicles are feeling good and no longer ache. The patient was concerned at the risk of the tumors found on cystoscopy being cancerous. \par \par 01/22/2021: The patient presents today to review the results of his most recent CT urogram. On his last visit he underwent a cystoscopy that showed right inferior bladder wall was deeply erythematous and had a carpet of papillary growth as well as two dome like papillary tumors, one larger about 2 cm in diameter and the other 0.8 cm in diameter. Both were suspicious for malignancy. I conferred with Dr.Eran Gallagher on his CT urogram from 1/20/2021. It showed enhancing right bladder wall mass in keeping with the clinical history of bladder tumor. No evidence of metastatic disease or suspicious lymphadenopathy in the abdomen and pelvis.Left renal cysts visualized. He appears good in the upper tracts. Enhancement seems confined to mucosa. No gross invasion of muscular layer. Prostate is very large. No ureteral or renal tumors in collecting system or parenchyma. Nocturia 2-3x. Denies any lower back pain. He takes aspirin every day and has 3 stents. \par \par 02/05/2021: The patient presents today for a fill and pull which was successful. He did not have any burning after. He recently underwent a TURBT with  on 2/2/2021. Pathology report from 2/2/2021 showed invasive high grade urothelial carcinoma.The carcinoma invades lamina propria. Muscularis propria is not involved. No lymphovascular invasion. Benign prostate tissue also seen. \par \par 02/12/2021: The patient presents today for a follow up. He reports gradual improvement overall. He is 10 days post op. Pt reports there is less blood when he urinates. He describes it as a little spout of blood when he first begins to void but then the rest of his stream is clear. He still feels some pressure when voiding but the discomfort has subsided. He denies any burning. He would like to put off the first BCG treatment for one more week. He reports a fever of 100.6 degrees Fahrenheit  last night that was relieved with Tylenol. Appetite is good and bowel movements are good. He originally had some constipation right after the procedure but that has resolved. \par \par 03/30/2021: Patient presents today for a follow up. His urine is currently perfectly clear. He is not on any aspirin or blood thinners. He took his first BCG treatment two weeks ago. He retained in his bladder for two hours. He had no adverse side effects. He stopped taking aspirin night before and two nights after the treatment. He has stents. He then started the aspirin two nights after and bled profusely, describing it at the deepest, darkest red he's ever seen. When he stopped taking the aspirin it started to clearer a little bit each day. Pt has drank a lot of water. He spoke with his cardiologist and he does not want him to be off the blood thinner completely. \par \par 06/07/2021: Patient presents today for a follow up. Patient has been undergoing BCG treatments. He had some troubles with infections and bleeding at first but he has gone 5 successive weeks without problems now. He has some burning for a day after treatment but nothing bothersome. He denies urinary urgency, frequency, or hematuria. Other than that he has had no side effects. He reports that when he took Levofloxacin, he noticed both times he develop a blister on his hand that was itchy and present for two days. Patient is scheduled for a cystoscopy towards the end of June. Urine cytology of 5/19/2021 was negative for high grade urothelial carcinoma. Creatinine was 1.3 on 12/7/2020. [Urinary Retention] : no urinary retention [Urinary Urgency] : no urinary urgency [Hematuria - Gross] : no gross hematuria

## 2021-06-08 NOTE — ASSESSMENT
[FreeTextEntry1] : 12/15/2020: 70 yr male with BPH, pSA elevation , LUTs nocturia .  Acute left orchitis May 2019 resolved. \par Following for pSA elevation to rule out prostatic neoplasm\par \par patient concerned for prostate cancer preventive surveillance .  Asking if he was due for another MRI prostate.  Last MRI prostate 2016\par Knee chest position was used for digital rectal exam. No suspicious rectal masses. No rectal mucosal lesions. Anal tone is normal. The prostate is non tender, with normal texture, discrete borders, and no nodules. It is a 45 gram transurethral resection size. Prostate is wide. No gross blood on examining fingers. \par Repeat prostate mri\par PSA\par Alkaline phosphate\par UA micros\par Doing well on finasteride and flomax.  Has retrograde ejaculation but tolerable symptoms per patient. \par Continue finasteride \par Continue flomax \par \par 12/15/2020: The patient gave permission for a telephone visit. His PSA has been elevated and was 4.52 on 9/30/2020. He had an MRI on 12/9/2020 that showed there is an 0.8 x 0.8 cm polypoid lesion at the left bladder wall. This is consistent with bladder malignancy. 147 cc gland with mass effect on the bladder.\par No MRI suspicious prostate lesions. *PIRADS 1 - Very low (clinically significant cancer is highly unlikely to be present). Patient was never a smoker. \par \par I discussed the results of the patient's MRI with him. \par The patient will undergo a cystoscopy for the mass found in the bladder on the MRI. Dictation states left bladder wall mass but images show right 0.8 x 0.8 cm bladder mass. Radiology notified of discrepancy. I advised the patient to eat on the day of the procedure and can drive themselves if they like. I informed the patient we will insert lidocaine for local anesthesia. I discussed the risk of infection, but informed the patient that we will provide an antibiotic after the procedure and at bedtime. \par \par 01/19/2021: The patient presents today for a cystoscopy for a mass found in the bladder on the MRI which also stated PIRADS-1. The patient denies hematuria. He is on a diuretic and has some urinary frequency. He reports stable urination patterns. THe patient recently had a renal sonogram due to an elevated creatinine. He reports his testicles are feeling good and no longer ache. The patient was concerned at the risk of the tumors found on cystoscopy being cancerous. \par \par Lidocaine jelly was injected for lubrication and numbing. Had no urethral tumors, strictures or stones. Lateral lobe hypertrophy of the prostate. No bladder stones. 2+ trabeculated bladder. Right inferior bladder wall was deeply erythematous and had a carpet of papillary growth as well as two dome like papillary tumors, one larger about 2 cm in diameter and the other 0.8 cm in diameter. Both are suspicious for malignancy. The patient took one Bactrim tablet at the time of the procedure and will take one before bed. \par \par I informed him that it is about an 80% chance of the bladder tumors being cancer. \par I recommended shaving out the bladder tumors to send them for pathology. I explained to him the procedure process, risks and preventative measures after the procedure including medications. I informed him about the options he has if it is high grade. I am recommending him to  for the procedure who will further discuss it with him. \par \par I am sending the pt for a CT urogram since I explained to him that 5% of people who have these tumors in the bladder also have it in the upper tract. The patient will have his recent blood work faxed over. \par The patient will RTO after the CT urogram. \par \par 01/22/2021: The patient presents today to review the results of his most recent CT urogram. On his last visit he underwent a cystoscopy that showed right inferior bladder wall was deeply erythematous and had a carpet of papillary growth as well as two dome like papillary tumors, one larger about 2 cm in diameter and the other 0.8 cm in diameter. Both were suspicious for malignancy. I conferred with Dr.Eran Gallagher on his CT urogram from 1/20/2021. It showed enhancing right bladder wall mass in keeping with the clinical history of bladder tumor. No evidence of metastatic disease or suspicious lymphadenopathy in the abdomen and pelvis.Left renal cysts visualized. He appears good in the upper tracts. Enhancement seems confined to mucosa. No gross invasion of muscular layer. Prostate is very large. No ureteral or renal tumors in collecting system or parenchyma. Nocturia 2-3x. Denies any lower back pain. He takes aspirin every day and has 3 stents. \par \par I informed him he can stay on the aspirin for the procedure. \par The pt will undergo a TURBT with  and is scheduled for it on 2/2/2021. \par \par 02/05/2021: The patient presents today for a fill and pull which was successful. He did not have any burning after. He recently underwent a TURBT with  on 2/2/2021. Pathology report from 2/2/2021 showed invasive high grade urothelial carcinoma.The carcinoma invades lamina propria. Muscularis propria is not involved. No lymphovascular invasion. Benign prostate tissue also seen. \par \par Advised to drink a lot of fluids. \par He was informed to avoid sexual activity and any strenuous activity including lifting heavy objects. \par He will continue finasteride 5 mg once daily. \par I recommended the patient BCG treatments once a week for 6 weeks followed by cystoscopies every 3 months. I informed the patient that the injection may cause systemic illness resembling TB, urgency or frequency of urination, orchitis from BCG.\par The patient will start BCG treatments on 2/17/2021. If he is still experiencing some bleeding he will postpone for one more week.\par \par 02/12/2021: The patient presents today for a follow up. He reports gradual improvement overall. He is 10 days post op. Pt reports there is less blood when he urinates. He describes it as a little spout of blood when he first begins to void but then the rest of his stream is clear. He still feels some pressure when voiding but the discomfort has subsided. He denies any burning. He would like to put off the first BCG treatment for one more week. He reports a fever of 100.6 degrees Fahrenheit  last night that was relieved with Tylenol. Appetite is good and bowel movements are good. He originally had some constipation right after the procedure but that has resolved. \par \par I informed him that it would be a good idea to put off the BCG treatment for another 2 weeks since he is still bleeding. I explained to the pt that his dark urine is most likely from the prostate still bleeding and that it should resolve soon. A small portion of his prostate was removed so access to the tumor was easier. \par I informed the pt that his fever could be from possible atelectasis resolving but its also possible that it was a transient infection. The patient produced a urine sample which will be sent for urinalysis, urine cytology, and urine culture. Urine specimen was grossly bloody and opaque in high blood content. \par Informed the pt to continue to avoid strenuous activities. \par He is on aspirin that I instructed him to continue. \par We had a lengthy discussion about the course his disease might take as well as managing it. We discussed inter vesical therapy and maintenance. We discussed cystoscopic surveillance as well as upper tract imaging. We discussed potential for muscle invasive disease in the future. Spoke about considerations such as chemotherapy and cystectomy as well as radiation with radiation sensitizers. We also spoke about potential urinary diversions if cystectomy was performed. \par The pt will RTO in 2 weeks for start of BCG treatments so long as he is not bleeding anymore. \par \par 03/30/2021: Patient presents today for a follow up. His urine is currently perfectly clear. He is not on any aspirin or blood thinners. He took his first BCG treatment two weeks ago. He retained in his bladder for two hours. He had no adverse side effects. He stopped taking aspirin night before and two nights after the treatment. He has stents. He then started the aspirin two nights after and bled profusely, describing it at the deepest, darkest red he's ever seen. When he stopped taking the aspirin it started to clearer a little bit each day. Pt has drank a lot of water. He spoke with his cardiologist and he does not want him to be off the blood thinner completely. \par \par My recommendation was that we stop BCG treatments until his next cystoscopy due to the risk of clotting without taking the aspirin and taking aspirin encouraging the bleeding. Patient would like to continue with the BCG treatments, despite discussing issues of staying off aspirin causing possible myocardial infarction, however we will proceed cautiously and will have a BCG treatment on Thursday. He will not take the aspirin before Thursday and he can proceed to take it after two days of clear urine after treatment. \par \par I informed the patient that the blood thinner is not causing the bleeding but making the bleeding worse. I informed him he needs to avoid any straining to prevent bleeding. I advised him to take 2 stool softeners twice daily. I informed him that the more treatments he gets, the more irritated the bladder gets.\par \par Prior MRI's and imaging reviewed with the patient and his wife. \par The patient produced a urine sample which will be sent for urinalysis, urine cytology, and urine culture. If his urinalysis shows any sign of infection, he will not undergo a BCG treatment this week. The BCG treatment does cause pyuria. \par Patient will RTO Thursday for BCG treatment. \par \par 06/07/2021: Patient presents today for a follow up. Patient has been undergoing BCG treatments. He had some troubles with infections and bleeding at first but he has gone 5 successive weeks without problems now. He has some burning for a day after treatment but nothing bothersome. He denies urinary urgency, frequency, or hematuria. Other than that he has had no side effects. He reports that when he took Levofloxacin, he noticed both times he develop a blister on his hand that was itchy and present for two days. Patient is scheduled for a cystoscopy towards the end of June. Urine cytology of 5/19/2021 was negative for high grade urothelial carcinoma. Creatinine was 1.3 on 12/7/2020. \par \par Patient would prefer to avoid levofloxacin when feasible but he did tolerate it.\par \par I am sending the patient for a CT Abdomen and Pelvis Urogram w/wo contrast. \par \par The patient produced a urine sample which will be sent for urinalysis, urine cytology, and urine culture. \par \par Patient will RTO after the CT to go over the results. \par \par Preparation, in person office visit, and coordination of care:30 minutes.

## 2021-06-08 NOTE — ADDENDUM
[FreeTextEntry1] : This note was authored by Esperanza Evans working as a scribe for Dr.Gary Mariano. I, Dr. Sami Mariano have reviewed the content of this note and confirm it is true and accurate. I personally performed the history and physical examination and made all the decisions 06/07/2021.

## 2021-06-10 ENCOUNTER — OUTPATIENT (OUTPATIENT)
Dept: OUTPATIENT SERVICES | Facility: HOSPITAL | Age: 71
LOS: 1 days | End: 2021-06-10
Payer: MEDICARE

## 2021-06-10 ENCOUNTER — APPOINTMENT (OUTPATIENT)
Dept: UROLOGY | Facility: CLINIC | Age: 71
End: 2021-06-10
Payer: MEDICARE

## 2021-06-10 ENCOUNTER — APPOINTMENT (OUTPATIENT)
Dept: CT IMAGING | Facility: IMAGING CENTER | Age: 71
End: 2021-06-10
Payer: MEDICARE

## 2021-06-10 DIAGNOSIS — Z95.5 PRESENCE OF CORONARY ANGIOPLASTY IMPLANT AND GRAFT: Chronic | ICD-10-CM

## 2021-06-10 DIAGNOSIS — C67.9 MALIGNANT NEOPLASM OF BLADDER, UNSPECIFIED: ICD-10-CM

## 2021-06-10 DIAGNOSIS — Z98.890 OTHER SPECIFIED POSTPROCEDURAL STATES: Chronic | ICD-10-CM

## 2021-06-10 DIAGNOSIS — Z98.1 ARTHRODESIS STATUS: Chronic | ICD-10-CM

## 2021-06-10 LAB
APPEARANCE: CLEAR
BACTERIA UR CULT: NORMAL
BACTERIA: NEGATIVE
BILIRUBIN URINE: NEGATIVE
BLOOD URINE: NEGATIVE
COLOR: YELLOW
GLUCOSE QUALITATIVE U: NEGATIVE
HYALINE CASTS: 1 /LPF
KETONES URINE: NEGATIVE
LEUKOCYTE ESTERASE URINE: ABNORMAL
MICROSCOPIC-UA: NORMAL
NITRITE URINE: NEGATIVE
PH URINE: 5.5
PROTEIN URINE: NORMAL
RED BLOOD CELLS URINE: 1 /HPF
SPECIFIC GRAVITY URINE: 1.02
SQUAMOUS EPITHELIAL CELLS: 4 /HPF
URINE CYTOLOGY: NORMAL
UROBILINOGEN URINE: NORMAL
WHITE BLOOD CELLS URINE: 10 /HPF

## 2021-06-10 PROCEDURE — 82565 ASSAY OF CREATININE: CPT

## 2021-06-10 PROCEDURE — 99214 OFFICE O/P EST MOD 30 MIN: CPT

## 2021-06-10 PROCEDURE — 74178 CT ABD&PLV WO CNTR FLWD CNTR: CPT | Mod: 26,MH

## 2021-06-10 PROCEDURE — 74178 CT ABD&PLV WO CNTR FLWD CNTR: CPT

## 2021-06-12 NOTE — HISTORY OF PRESENT ILLNESS
[Nocturia] : nocturia [Erectile Dysfunction] : Erectile Dysfunction [FreeTextEntry1] : 12/15/2020:70 yr male with large BPH , testicular inflammation orchitis 5/19, resolved with abx. \par Doing well on finasteride and flomax . \par Nocturia 2-3x per night \par No blood in urine \par No more testicular pain or discomfort\par Psa 5/19 4.52,  5/20 4.63\par No FHx of prostate CA\par Patient is an  and works in his own firm. \par \par 12/15/2020: The patient gave permission for a telephone visit. His PSA has been elevated and was 4.52 on 9/30/2020. He had an MRI on 12/9/2020 that showed there is an 0.8 x 0.8 cm polypoid lesion at the left bladder wall. This is consistent with bladder malignancy. 147 cc gland with mass effect on the bladder.\par No MRI suspicious prostate lesions. *PIRADS 1 - Very low (clinically significant cancer is highly unlikely to be present). Patient was never a smoker. \par \par 01/19/2021: The patient presents today for a cystoscopy for a mass found in the bladder on the MRI which also stated PIRADS-1. The patient denies hematuria. He is on a diuretic and has some urinary frequency. He reports stable urination patterns. THe patient recently had a renal sonogram due to an elevated creatinine. He reports his testicles are feeling good and no longer ache. The patient was concerned at the risk of the tumors found on cystoscopy being cancerous. \par \par 01/22/2021: The patient presents today to review the results of his most recent CT urogram. On his last visit he underwent a cystoscopy that showed right inferior bladder wall was deeply erythematous and had a carpet of papillary growth as well as two dome like papillary tumors, one larger about 2 cm in diameter and the other 0.8 cm in diameter. Both were suspicious for malignancy. I conferred with Dr.Eran Gallagher on his CT urogram from 1/20/2021. It showed enhancing right bladder wall mass in keeping with the clinical history of bladder tumor. No evidence of metastatic disease or suspicious lymphadenopathy in the abdomen and pelvis.Left renal cysts visualized. He appears good in the upper tracts. Enhancement seems confined to mucosa. No gross invasion of muscular layer. Prostate is very large. No ureteral or renal tumors in collecting system or parenchyma. Nocturia 2-3x. Denies any lower back pain. He takes aspirin every day and has 3 stents. \par \par 02/05/2021: The patient presents today for a fill and pull which was successful. He did not have any burning after. He recently underwent a TURBT with  on 2/2/2021. Pathology report from 2/2/2021 showed invasive high grade urothelial carcinoma.The carcinoma invades lamina propria. Muscularis propria is not involved. No lymphovascular invasion. Benign prostate tissue also seen. \par \par 02/12/2021: The patient presents today for a follow up. He reports gradual improvement overall. He is 10 days post op. Pt reports there is less blood when he urinates. He describes it as a little spout of blood when he first begins to void but then the rest of his stream is clear. He still feels some pressure when voiding but the discomfort has subsided. He denies any burning. He would like to put off the first BCG treatment for one more week. He reports a fever of 100.6 degrees Fahrenheit  last night that was relieved with Tylenol. Appetite is good and bowel movements are good. He originally had some constipation right after the procedure but that has resolved. \par \par 03/30/2021: Patient presents today for a follow up. His urine is currently perfectly clear. He is not on any aspirin or blood thinners. He took his first BCG treatment two weeks ago. He retained in his bladder for two hours. He had no adverse side effects. He stopped taking aspirin night before and two nights after the treatment. He has stents. He then started the aspirin two nights after and bled profusely, describing it at the deepest, darkest red he's ever seen. When he stopped taking the aspirin it started to clearer a little bit each day. Pt has drank a lot of water. He spoke with his cardiologist and he does not want him to be off the blood thinner completely. \par \par 06/07/2021: Patient presents today for a follow up. Patient has been undergoing BCG treatments. He had some troubles with infections and bleeding at first but he has gone 5 successive weeks without problems now. He has some burning for a day after treatment but nothing bothersome. He denies urinary urgency, frequency, or hematuria. Other than that he has had no side effects. He reports that when he took Levofloxacin, he noticed both times he develop a blister on his hand that was itchy and present for two days. Patient is scheduled for a cystoscopy towards the end of June. Urine cytology of 5/19/2021 was negative for high grade urothelial carcinoma. Creatinine was 1.3 on 12/7/2020.\par \par 06/10/2021: Pt presents today for a follow up. The urine cytology from 6/7/21 showed no signs of low grade or high grade tumors. The urinalysis and urine culture from 6/7/21 showed no significant results.  The patient reports good urination. He is currently undergoing BCG treatments. He is scheduled for a cystoscopy for later in June. He will resume the BCG treatments one week after his cystoscopy.\par \par  [Urinary Retention] : no urinary retention [Urinary Urgency] : no urinary urgency [Hematuria - Gross] : no gross hematuria

## 2021-06-12 NOTE — ASSESSMENT
[FreeTextEntry1] : 12/15/2020: 70 yr male with BPH, PSA elevation , LUTs nocturia .  Acute left orchitis May 2019 resolved. \par Following for PSA elevation to rule out prostatic neoplasm\par \par patient concerned for prostate cancer preventive surveillance .  Asking if he was due for another MRI prostate.  Last MRI prostate 2016\par Knee chest position was used for digital rectal exam. No suspicious rectal masses. No rectal mucosal lesions. Anal tone is normal. The prostate is non tender, with normal texture, discrete borders, and no nodules. It is a 45 gram transurethral resection size. Prostate is wide. No gross blood on examining fingers. \par Repeat prostate mri\par PSA\par Alkaline phosphate\par UA micros\par Doing well on finasteride and Flomax.  Has retrograde ejaculation but tolerable symptoms per patient. \par Continue finasteride \par Continue Flomax \par \par 12/15/2020: The patient gave permission for a telephone visit. His PSA has been elevated and was 4.52 on 9/30/2020. He had an MRI on 12/9/2020 that showed there is an 0.8 x 0.8 cm polypoid lesion at the left bladder wall. This is consistent with bladder malignancy. 147 cc gland with mass effect on the bladder.\par No MRI suspicious prostate lesions. *PIRADS 1 - Very low (clinically significant cancer is highly unlikely to be present). Patient was never a smoker. \par \par I discussed the results of the patient's MRI with him. \par The patient will undergo a cystoscopy for the mass found in the bladder on the MRI. Dictation states left bladder wall mass but images show right 0.8 x 0.8 cm bladder mass. Radiology notified of discrepancy. I advised the patient to eat on the day of the procedure and can drive themselves if they like. I informed the patient we will insert lidocaine for local anesthesia. I discussed the risk of infection, but informed the patient that we will provide an antibiotic after the procedure and at bedtime. \par \par 01/19/2021: The patient presents today for a cystoscopy for a mass found in the bladder on the MRI which also stated PIRADS-1. The patient denies hematuria. He is on a diuretic and has some urinary frequency. He reports stable urination patterns. THe patient recently had a renal sonogram due to an elevated creatinine. He reports his testicles are feeling good and no longer ache. The patient was concerned at the risk of the tumors found on cystoscopy being cancerous. \par \par Lidocaine jelly was injected for lubrication and numbing. Had no urethral tumors, strictures or stones. Lateral lobe hypertrophy of the prostate. No bladder stones. 2+ trabeculated bladder. Right inferior bladder wall was deeply erythematous and had a carpet of papillary growth as well as two dome like papillary tumors, one larger about 2 cm in diameter and the other 0.8 cm in diameter. Both are suspicious for malignancy. The patient took one Bactrim tablet at the time of the procedure and will take one before bed. \par \par I informed him that it is about an 80% chance of the bladder tumors being cancer. \par I recommended shaving out the bladder tumors to send them for pathology. I explained to him the procedure process, risks and preventative measures after the procedure including medications. I informed him about the options he has if it is high grade. I am recommending him to  for the procedure who will further discuss it with him. \par \par I am sending the pt for a CT urogram since I explained to him that 5% of people who have these tumors in the bladder also have it in the upper tract. The patient will have his recent blood work faxed over. \par The patient will RTO after the CT urogram. \par \par 01/22/2021: The patient presents today to review the results of his most recent CT urogram. On his last visit he underwent a cystoscopy that showed right inferior bladder wall was deeply erythematous and had a carpet of papillary growth as well as two dome like papillary tumors, one larger about 2 cm in diameter and the other 0.8 cm in diameter. Both were suspicious for malignancy. I conferred with Dr.Eran Gallagher on his CT urogram from 1/20/2021. It showed enhancing right bladder wall mass in keeping with the clinical history of bladder tumor. No evidence of metastatic disease or suspicious lymphadenopathy in the abdomen and pelvis.Left renal cysts visualized. He appears good in the upper tracts. Enhancement seems confined to mucosa. No gross invasion of muscular layer. Prostate is very large. No ureteral or renal tumors in collecting system or parenchyma. Nocturia 2-3x. Denies any lower back pain. He takes aspirin every day and has 3 stents. \par \par I informed him he can stay on the aspirin for the procedure. \par The pt will undergo a TURBT with  and is scheduled for it on 2/2/2021. \par \par 02/05/2021: The patient presents today for a fill and pull which was successful. He did not have any burning after. He recently underwent a TURBT with  on 2/2/2021. Pathology report from 2/2/2021 showed invasive high grade urothelial carcinoma.The carcinoma invades lamina propria. Muscularis propria is not involved. No lymphovascular invasion. Benign prostate tissue also seen. \par \par Advised to drink a lot of fluids. \par He was informed to avoid sexual activity and any strenuous activity including lifting heavy objects. \par He will continue finasteride 5 mg once daily. \par I recommended the patient BCG treatments once a week for 6 weeks followed by cystoscopies every 3 months. I informed the patient that the injection may cause systemic illness resembling TB, urgency or frequency of urination, orchitis from BCG.\par The patient will start BCG treatments on 2/17/2021. If he is still experiencing some bleeding he will postpone for one more week.\par \par 02/12/2021: The patient presents today for a follow up. He reports gradual improvement overall. He is 10 days post op. Pt reports there is less blood when he urinates. He describes it as a little spout of blood when he first begins to void but then the rest of his stream is clear. He still feels some pressure when voiding but the discomfort has subsided. He denies any burning. He would like to put off the first BCG treatment for one more week. He reports a fever of 100.6 degrees Fahrenheit  last night that was relieved with Tylenol. Appetite is good and bowel movements are good. He originally had some constipation right after the procedure but that has resolved. \par \par I informed him that it would be a good idea to put off the BCG treatment for another 2 weeks since he is still bleeding. I explained to the pt that his dark urine is most likely from the prostate still bleeding and that it should resolve soon. A small portion of his prostate was removed so access to the tumor was easier. \par I informed the pt that his fever could be from possible atelectasis resolving but its also possible that it was a transient infection. The patient produced a urine sample which will be sent for urinalysis, urine cytology, and urine culture. Urine specimen was grossly bloody and opaque in high blood content. \par Informed the pt to continue to avoid strenuous activities. \par He is on aspirin that I instructed him to continue. \par We had a lengthy discussion about the course his disease might take as well as managing it. We discussed inter vesical therapy and maintenance. We discussed cystoscopic surveillance as well as upper tract imaging. We discussed potential for muscle invasive disease in the future. Spoke about considerations such as chemotherapy and cystectomy as well as radiation with radiation sensitizers. We also spoke about potential urinary diversions if cystectomy was performed. \par The pt will RTO in 2 weeks for start of BCG treatments so long as he is not bleeding anymore. \par \par 03/30/2021: Patient presents today for a follow up. His urine is currently perfectly clear. He is not on any aspirin or blood thinners. He took his first BCG treatment two weeks ago. He retained in his bladder for two hours. He had no adverse side effects. He stopped taking aspirin night before and two nights after the treatment. He has stents. He then started the aspirin two nights after and bled profusely, describing it at the deepest, darkest red he's ever seen. When he stopped taking the aspirin it started to clearer a little bit each day. Pt has drank a lot of water. He spoke with his cardiologist and he does not want him to be off the blood thinner completely. \par \par My recommendation was that we stop BCG treatments until his next cystoscopy due to the risk of clotting without taking the aspirin and taking aspirin encouraging the bleeding. Patient would like to continue with the BCG treatments, despite discussing issues of staying off aspirin causing possible myocardial infarction, however we will proceed cautiously and will have a BCG treatment on Thursday. He will not take the aspirin before Thursday and he can proceed to take it after two days of clear urine after treatment. \par \par I informed the patient that the blood thinner is not causing the bleeding but making the bleeding worse. I informed him he needs to avoid any straining to prevent bleeding. I advised him to take 2 stool softeners twice daily. I informed him that the more treatments he gets, the more irritated the bladder gets.\par \par Prior MRI's and imaging reviewed with the patient and his wife. \par The patient produced a urine sample which will be sent for urinalysis, urine cytology, and urine culture. If his urinalysis shows any sign of infection, he will not undergo a BCG treatment this week. The BCG treatment does cause pyuria. \par Patient will RTO Thursday for BCG treatment. \par \par 06/07/2021: Patient presents today for a follow up. Patient has been undergoing BCG treatments. He had some troubles with infections and bleeding at first but he has gone 5 successive weeks without problems now. He has some burning for a day after treatment but nothing bothersome. He denies urinary urgency, frequency, or hematuria. Other than that he has had no side effects. He reports that when he took Levofloxacin, he noticed both times he develop a blister on his hand that was itchy and present for two days. Patient is scheduled for a cystoscopy towards the end of June. Urine cytology of 5/19/2021 was negative for high grade urothelial carcinoma. Creatinine was 1.3 on 12/7/2020. \par \par 06/10/2021: Pt presents today for a follow up. The urine cytology from 6/7/21 showed no signs of low grade or high grade tumors. The urinalysis and urine culture from 6/7/21 showed no significant results.  The patient reports good urination. He is currently undergoing BCG treatments. He is scheduled for a cystoscopy for later in June. He will resume the BCG treatments one week after his cystoscopy.\par \par The CT Abdomen and Pelvis from 6/10/21 showed:\par IMPRESSION:\par Thick-walled urinary bladder.\par Enlarged prostate.\par Enhancing right bladder wall mass no longer visualized\par \par Patient will RTO for his cystoscopy later this month. \par \par Preparation, in person office visit, and coordination of care: 30 minutes.

## 2021-06-21 ENCOUNTER — APPOINTMENT (OUTPATIENT)
Dept: UROLOGY | Facility: CLINIC | Age: 71
End: 2021-06-21
Payer: MEDICARE

## 2021-06-21 ENCOUNTER — OUTPATIENT (OUTPATIENT)
Dept: OUTPATIENT SERVICES | Facility: HOSPITAL | Age: 71
LOS: 1 days | End: 2021-06-21
Payer: MEDICARE

## 2021-06-21 DIAGNOSIS — Z98.890 OTHER SPECIFIED POSTPROCEDURAL STATES: Chronic | ICD-10-CM

## 2021-06-21 DIAGNOSIS — Z95.5 PRESENCE OF CORONARY ANGIOPLASTY IMPLANT AND GRAFT: Chronic | ICD-10-CM

## 2021-06-21 DIAGNOSIS — C67.9 MALIGNANT NEOPLASM OF BLADDER, UNSPECIFIED: ICD-10-CM

## 2021-06-21 DIAGNOSIS — N40.1 BENIGN PROSTATIC HYPERPLASIA WITH LOWER URINARY TRACT SYMPTOMS: ICD-10-CM

## 2021-06-21 DIAGNOSIS — R35.0 FREQUENCY OF MICTURITION: ICD-10-CM

## 2021-06-21 DIAGNOSIS — Z98.1 ARTHRODESIS STATUS: Chronic | ICD-10-CM

## 2021-06-21 DIAGNOSIS — R31.0 GROSS HEMATURIA: ICD-10-CM

## 2021-06-21 PROCEDURE — 99213 OFFICE O/P EST LOW 20 MIN: CPT | Mod: 25

## 2021-06-21 PROCEDURE — 88112 CYTOPATH CELL ENHANCE TECH: CPT | Mod: 26

## 2021-06-21 PROCEDURE — 52000 CYSTOURETHROSCOPY: CPT

## 2021-06-21 NOTE — REVIEW OF SYSTEMS
[Constipation] : constipation [Heartburn] : heartburn [Urine Is Cloudy] : cloudy urine [Feeling Poorly] : not feeling poorly [Dysuria] : no dysuria [Difficulty Walking] : no difficulty walking

## 2021-06-21 NOTE — ASSESSMENT
[FreeTextEntry1] : 12/15/2020: 70 yr male with BPH, PSA elevation , LUTs nocturia .  Acute left orchitis May 2019 resolved. \par Following for PSA elevation to rule out prostatic neoplasm\par \par patient concerned for prostate cancer preventive surveillance .  Asking if he was due for another MRI prostate.  Last MRI prostate 2016\par Knee chest position was used for digital rectal exam. No suspicious rectal masses. No rectal mucosal lesions. Anal tone is normal. The prostate is non tender, with normal texture, discrete borders, and no nodules. It is a 45 gram transurethral resection size. Prostate is wide. No gross blood on examining fingers. \par Repeat prostate mri\par PSA\par Alkaline phosphate\par UA micros\par Doing well on finasteride and Flomax.  Has retrograde ejaculation but tolerable symptoms per patient. \par Continue finasteride \par Continue Flomax \par \par 12/15/2020: The patient gave permission for a telephone visit. His PSA has been elevated and was 4.52 on 9/30/2020. He had an MRI on 12/9/2020 that showed there is an 0.8 x 0.8 cm polypoid lesion at the left bladder wall. This is consistent with bladder malignancy. 147 cc gland with mass effect on the bladder.\par No MRI suspicious prostate lesions. *PIRADS 1 - Very low (clinically significant cancer is highly unlikely to be present). Patient was never a smoker. \par \par I discussed the results of the patient's MRI with him. \par The patient will undergo a cystoscopy for the mass found in the bladder on the MRI. Dictation states left bladder wall mass but images show right 0.8 x 0.8 cm bladder mass. Radiology notified of discrepancy. I advised the patient to eat on the day of the procedure and can drive themselves if they like. I informed the patient we will insert lidocaine for local anesthesia. I discussed the risk of infection, but informed the patient that we will provide an antibiotic after the procedure and at bedtime. \par \par 01/19/2021: The patient presents today for a cystoscopy for a mass found in the bladder on the MRI which also stated PIRADS-1. The patient denies hematuria. He is on a diuretic and has some urinary frequency. He reports stable urination patterns. THe patient recently had a renal sonogram due to an elevated creatinine. He reports his testicles are feeling good and no longer ache. The patient was concerned at the risk of the tumors found on cystoscopy being cancerous. \par \par Lidocaine jelly was injected for lubrication and numbing. Had no urethral tumors, strictures or stones. Lateral lobe hypertrophy of the prostate. No bladder stones. 2+ trabeculated bladder. Right inferior bladder wall was deeply erythematous and had a carpet of papillary growth as well as two dome like papillary tumors, one larger about 2 cm in diameter and the other 0.8 cm in diameter. Both are suspicious for malignancy. The patient took one Bactrim tablet at the time of the procedure and will take one before bed. \par \par I informed him that it is about an 80% chance of the bladder tumors being cancer. \par I recommended shaving out the bladder tumors to send them for pathology. I explained to him the procedure process, risks and preventative measures after the procedure including medications. I informed him about the options he has if it is high grade. I am recommending him to  for the procedure who will further discuss it with him. \par \par I am sending the pt for a CT urogram since I explained to him that 5% of people who have these tumors in the bladder also have it in the upper tract. The patient will have his recent blood work faxed over. \par The patient will RTO after the CT urogram. \par \par 01/22/2021: The patient presents today to review the results of his most recent CT urogram. On his last visit he underwent a cystoscopy that showed right inferior bladder wall was deeply erythematous and had a carpet of papillary growth as well as two dome like papillary tumors, one larger about 2 cm in diameter and the other 0.8 cm in diameter. Both were suspicious for malignancy. I conferred with Dr.Eran Gallagher on his CT urogram from 1/20/2021. It showed enhancing right bladder wall mass in keeping with the clinical history of bladder tumor. No evidence of metastatic disease or suspicious lymphadenopathy in the abdomen and pelvis.Left renal cysts visualized. He appears good in the upper tracts. Enhancement seems confined to mucosa. No gross invasion of muscular layer. Prostate is very large. No ureteral or renal tumors in collecting system or parenchyma. Nocturia 2-3x. Denies any lower back pain. He takes aspirin every day and has 3 stents. \par \par I informed him he can stay on the aspirin for the procedure. \par The pt will undergo a TURBT with  and is scheduled for it on 2/2/2021. \par \par 02/05/2021: The patient presents today for a fill and pull which was successful. He did not have any burning after. He recently underwent a TURBT with  on 2/2/2021. Pathology report from 2/2/2021 showed invasive high grade urothelial carcinoma.The carcinoma invades lamina propria. Muscularis propria is not involved. No lymphovascular invasion. Benign prostate tissue also seen. \par \par Advised to drink a lot of fluids. \par He was informed to avoid sexual activity and any strenuous activity including lifting heavy objects. \par He will continue finasteride 5 mg once daily. \par I recommended the patient BCG treatments once a week for 6 weeks followed by cystoscopies every 3 months. I informed the patient that the injection may cause systemic illness resembling TB, urgency or frequency of urination, orchitis from BCG.\par The patient will start BCG treatments on 2/17/2021. If he is still experiencing some bleeding he will postpone for one more week.\par \par 02/12/2021: The patient presents today for a follow up. He reports gradual improvement overall. He is 10 days post op. Pt reports there is less blood when he urinates. He describes it as a little spout of blood when he first begins to void but then the rest of his stream is clear. He still feels some pressure when voiding but the discomfort has subsided. He denies any burning. He would like to put off the first BCG treatment for one more week. He reports a fever of 100.6 degrees Fahrenheit  last night that was relieved with Tylenol. Appetite is good and bowel movements are good. He originally had some constipation right after the procedure but that has resolved. \par \par I informed him that it would be a good idea to put off the BCG treatment for another 2 weeks since he is still bleeding. I explained to the pt that his dark urine is most likely from the prostate still bleeding and that it should resolve soon. A small portion of his prostate was removed so access to the tumor was easier. \par I informed the pt that his fever could be from possible atelectasis resolving but its also possible that it was a transient infection. The patient produced a urine sample which will be sent for urinalysis, urine cytology, and urine culture. Urine specimen was grossly bloody and opaque in high blood content. \par Informed the pt to continue to avoid strenuous activities. \par He is on aspirin that I instructed him to continue. \par We had a lengthy discussion about the course his disease might take as well as managing it. We discussed inter vesical therapy and maintenance. We discussed cystoscopic surveillance as well as upper tract imaging. We discussed potential for muscle invasive disease in the future. Spoke about considerations such as chemotherapy and cystectomy as well as radiation with radiation sensitizers. We also spoke about potential urinary diversions if cystectomy was performed. \par The pt will RTO in 2 weeks for start of BCG treatments so long as he is not bleeding anymore. \par \par 03/30/2021: Patient presents today for a follow up. His urine is currently perfectly clear. He is not on any aspirin or blood thinners. He took his first BCG treatment two weeks ago. He retained in his bladder for two hours. He had no adverse side effects. He stopped taking aspirin night before and two nights after the treatment. He has stents. He then started the aspirin two nights after and bled profusely, describing it at the deepest, darkest red he's ever seen. When he stopped taking the aspirin it started to clearer a little bit each day. Pt has drank a lot of water. He spoke with his cardiologist and he does not want him to be off the blood thinner completely. \par \par My recommendation was that we stop BCG treatments until his next cystoscopy due to the risk of clotting without taking the aspirin and taking aspirin encouraging the bleeding. Patient would like to continue with the BCG treatments, despite discussing issues of staying off aspirin causing possible myocardial infarction, however we will proceed cautiously and will have a BCG treatment on Thursday. He will not take the aspirin before Thursday and he can proceed to take it after two days of clear urine after treatment. \par \par I informed the patient that the blood thinner is not causing the bleeding but making the bleeding worse. I informed him he needs to avoid any straining to prevent bleeding. I advised him to take 2 stool softeners twice daily. I informed him that the more treatments he gets, the more irritated the bladder gets.\par \par Prior MRI's and imaging reviewed with the patient and his wife. \par The patient produced a urine sample which will be sent for urinalysis, urine cytology, and urine culture. If his urinalysis shows any sign of infection, he will not undergo a BCG treatment this week. The BCG treatment does cause pyuria. \par Patient will RTO Thursday for BCG treatment. \par \par 06/07/2021: Patient presents today for a follow up. Patient has been undergoing BCG treatments. He had some troubles with infections and bleeding at first but he has gone 5 successive weeks without problems now. He has some burning for a day after treatment but nothing bothersome. He denies urinary urgency, frequency, or hematuria. Other than that he has had no side effects. He reports that when he took Levofloxacin, he noticed both times he develop a blister on his hand that was itchy and present for two days. Patient is scheduled for a cystoscopy towards the end of June. Urine cytology of 5/19/2021 was negative for high grade urothelial carcinoma. Creatinine was 1.3 on 12/7/2020. \par \par 06/10/2021: Pt presents today for a follow up. The urine cytology from 6/7/21 showed no signs of low grade or high grade tumors. The urinalysis and urine culture from 6/7/21 showed no significant results.  The patient reports good urination. He is currently undergoing BCG treatments. He is scheduled for another cystoscopy four lweeks from now and then BCG two weeks laterJune. He will resume the BCG treatments one week after his cystoscopy.\par \par The CT Abdomen and Pelvis from 6/10/21 showed:\par IMPRESSION:\par Thick-walled urinary bladder.\par Enlarged prostate.\par Enhancing right bladder wall mass no longer visualized\par Patient will RTO for his cystoscopy later this month. \par \par 06/21/2021: Patient presents today for a cystoscopy. Pt tolerated the procedure well. He reports his urination has remained good and he denies any gross hematuria. Pt takes baby aspirin every day and has two stents covering 3 areas. Does not take any other blood thinners. Of note, patient will be traveling to [place] towards the end of July 2021. \par \par Cystoscopy: Lidocaine jelly was inserted for numbing and lubrication. The prostate is very large and there is a false passage way anteriorly. Urine in the bladder was turbid. Bladder became less turbid when filled with irrigation fluid. There is a bit of blood oozing from the prostate. In following the mucosa around, there is no tumor visible but vision is suboptimal. Anterior bladder looks good. Bladder capacity is about 350. Having evacuated the bladder twice, vision became more adequate. Upon retroflexing the cystoscope, there are no tumors seen. There is a little inflammation anteriorly in the form of cystitis glandularis. Little bolus edema at the bladder neck anteriorly. No strictures seen. The patient took one Bactrim tablet at the time of the procedure and will take one before bed. \par \par The patient produced a urine sample which will be sent for urinalysis, urine cytology, and urine culture. \par \par Advised the patient to drink a lot of water when he goes home today. \par \par Patient will have another cystoscopy in 4 weeks time and will then resume intravesical BCG treatments 2 weeks later.

## 2021-06-21 NOTE — HISTORY OF PRESENT ILLNESS
[Nocturia] : nocturia [Erectile Dysfunction] : Erectile Dysfunction [FreeTextEntry1] : 12/15/2020:70 yr male with large BPH , testicular inflammation orchitis 5/19, resolved with abx. \par Doing well on finasteride and flomax . \par Nocturia 2-3x per night \par No blood in urine \par No more testicular pain or discomfort\par Psa 5/19 4.52,  5/20 4.63\par No FHx of prostate CA\par Patient is an  and works in his own firm. \par \par 12/15/2020: The patient gave permission for a telephone visit. His PSA has been elevated and was 4.52 on 9/30/2020. He had an MRI on 12/9/2020 that showed there is an 0.8 x 0.8 cm polypoid lesion at the left bladder wall. This is consistent with bladder malignancy. 147 cc gland with mass effect on the bladder.\par No MRI suspicious prostate lesions. *PIRADS 1 - Very low (clinically significant cancer is highly unlikely to be present). Patient was never a smoker. \par \par 01/19/2021: The patient presents today for a cystoscopy for a mass found in the bladder on the MRI which also stated PIRADS-1. The patient denies hematuria. He is on a diuretic and has some urinary frequency. He reports stable urination patterns. THe patient recently had a renal sonogram due to an elevated creatinine. He reports his testicles are feeling good and no longer ache. The patient was concerned at the risk of the tumors found on cystoscopy being cancerous. \par \par 01/22/2021: The patient presents today to review the results of his most recent CT urogram. On his last visit he underwent a cystoscopy that showed right inferior bladder wall was deeply erythematous and had a carpet of papillary growth as well as two dome like papillary tumors, one larger about 2 cm in diameter and the other 0.8 cm in diameter. Both were suspicious for malignancy. I conferred with Dr.Eran Gallagher on his CT urogram from 1/20/2021. It showed enhancing right bladder wall mass in keeping with the clinical history of bladder tumor. No evidence of metastatic disease or suspicious lymphadenopathy in the abdomen and pelvis.Left renal cysts visualized. He appears good in the upper tracts. Enhancement seems confined to mucosa. No gross invasion of muscular layer. Prostate is very large. No ureteral or renal tumors in collecting system or parenchyma. Nocturia 2-3x. Denies any lower back pain. He takes aspirin every day and has 3 stents. \par \par 02/05/2021: The patient presents today for a fill and pull which was successful. He did not have any burning after. He recently underwent a TURBT with  on 2/2/2021. Pathology report from 2/2/2021 showed invasive high grade urothelial carcinoma.The carcinoma invades lamina propria. Muscularis propria is not involved. No lymphovascular invasion. Benign prostate tissue also seen. \par \par 02/12/2021: The patient presents today for a follow up. He reports gradual improvement overall. He is 10 days post op. Pt reports there is less blood when he urinates. He describes it as a little spout of blood when he first begins to void but then the rest of his stream is clear. He still feels some pressure when voiding but the discomfort has subsided. He denies any burning. He would like to put off the first BCG treatment for one more week. He reports a fever of 100.6 degrees Fahrenheit  last night that was relieved with Tylenol. Appetite is good and bowel movements are good. He originally had some constipation right after the procedure but that has resolved. \par \par 03/30/2021: Patient presents today for a follow up. His urine is currently perfectly clear. He is not on any aspirin or blood thinners. He took his first BCG treatment two weeks ago. He retained in his bladder for two hours. He had no adverse side effects. He stopped taking aspirin night before and two nights after the treatment. He has stents. He then started the aspirin two nights after and bled profusely, describing it at the deepest, darkest red he's ever seen. When he stopped taking the aspirin it started to clearer a little bit each day. Pt has drank a lot of water. He spoke with his cardiologist and he does not want him to be off the blood thinner completely. \par \par 06/07/2021: Patient presents today for a follow up. Patient has been undergoing BCG treatments. He had some troubles with infections and bleeding at first but he has gone 5 successive weeks without problems now. He has some burning for a day after treatment but nothing bothersome. He denies urinary urgency, frequency, or hematuria. Other than that he has had no side effects. He reports that when he took Levofloxacin, he noticed both times he develop a blister on his hand that was itchy and present for two days. Patient is scheduled for a cystoscopy towards the end of June. Urine cytology of 5/19/2021 was negative for high grade urothelial carcinoma. Creatinine was 1.3 on 12/7/2020.\par \par 06/10/2021: Pt presents today for a follow up. The urine cytology from 6/7/21 showed no signs of low grade or high grade tumors. The urinalysis and urine culture from 6/7/21 showed no significant results.  The patient reports good urination. He is currently undergoing BCG treatments. He is scheduled for a cystoscopy for later in June. He will resume the BCG treatments one week after his cystoscopy.\par \par 06/21/2021: Patient presents today for a cystoscopy. Pt tolerated the procedure well. He reports his urination has remained good and he denies any gross hematuria. Pt takes baby aspirin every day and has two stents covering 3 areas. Does not take any other blood thinners. Of note, patient will be traveling to [place] towards the end of July 2021. [Urinary Retention] : no urinary retention [Urinary Urgency] : no urinary urgency [Hematuria - Gross] : no gross hematuria

## 2021-06-21 NOTE — ADDENDUM
[FreeTextEntry1] : This note was authored by Esperanza Evans working as a scribe for Dr.Gary Mariano. I, Dr. Sami Mariano have reviewed the content of this note and confirm it is true and accurate. I personally performed the history and physical examination and made all the decisions 06/21/2021.

## 2021-06-22 ENCOUNTER — APPOINTMENT (OUTPATIENT)
Dept: UROLOGY | Facility: CLINIC | Age: 71
End: 2021-06-22

## 2021-06-29 ENCOUNTER — APPOINTMENT (OUTPATIENT)
Dept: UROLOGY | Facility: CLINIC | Age: 71
End: 2021-06-29

## 2021-07-03 LAB
APPEARANCE: CLEAR
BACTERIA: ABNORMAL
BILIRUBIN URINE: NEGATIVE
BLOOD URINE: NEGATIVE
COLOR: NORMAL
GLUCOSE QUALITATIVE U: NEGATIVE
HLX UV FISH FINAL REPORT: NORMAL
HYALINE CASTS: 0 /LPF
KETONES URINE: NEGATIVE
LEUKOCYTE ESTERASE URINE: ABNORMAL
MICROSCOPIC-UA: NORMAL
NITRITE URINE: POSITIVE
PH URINE: 6
PROTEIN URINE: ABNORMAL
RED BLOOD CELLS URINE: 1 /HPF
SPECIFIC GRAVITY URINE: >=1.03
SQUAMOUS EPITHELIAL CELLS: 5 /HPF
URINE COMMENTS: NORMAL
URINE CYTOLOGY: NORMAL
UROBILINOGEN URINE: NORMAL
WHITE BLOOD CELLS URINE: 40 /HPF

## 2021-07-22 ENCOUNTER — APPOINTMENT (OUTPATIENT)
Dept: UROLOGY | Facility: CLINIC | Age: 71
End: 2021-07-22

## 2021-07-22 ENCOUNTER — APPOINTMENT (OUTPATIENT)
Dept: UROLOGY | Facility: CLINIC | Age: 71
End: 2021-07-22
Payer: MEDICARE

## 2021-07-22 VITALS
HEART RATE: 55 BPM | OXYGEN SATURATION: 97 % | RESPIRATION RATE: 16 BRPM | DIASTOLIC BLOOD PRESSURE: 80 MMHG | SYSTOLIC BLOOD PRESSURE: 135 MMHG

## 2021-07-22 DIAGNOSIS — R31.29 OTHER MICROSCOPIC HEMATURIA: ICD-10-CM

## 2021-07-22 PROCEDURE — 99213 OFFICE O/P EST LOW 20 MIN: CPT | Mod: 25

## 2021-07-22 PROCEDURE — 52000 CYSTOURETHROSCOPY: CPT

## 2021-07-22 PROCEDURE — 88112 CYTOPATH CELL ENHANCE TECH: CPT | Mod: 26

## 2021-07-23 PROBLEM — R31.29 MICROSCOPIC HEMATURIA: Status: ACTIVE | Noted: 2021-03-30

## 2021-07-23 NOTE — ASSESSMENT
[FreeTextEntry1] : 12/15/2020: 71 yr male with BPH, PSA elevation , LUTs nocturia .  Acute left orchitis May 2019 resolved. \par Following for PSA elevation to rule out prostatic neoplasm\par \par patient concerned for prostate cancer preventive surveillance .  Asking if he was due for another MRI prostate.  Last MRI prostate 2016\par Knee chest position was used for digital rectal exam. No suspicious rectal masses. No rectal mucosal lesions. Anal tone is normal. The prostate is non tender, with normal texture, discrete borders, and no nodules. It is a 45 gram transurethral resection size. Prostate is wide. No gross blood on examining fingers. \par Repeat prostate mri\par PSA\par Alkaline phosphate\par UA micros\par Doing well on finasteride and Flomax.  Has retrograde ejaculation but tolerable symptoms per patient. \par Continue finasteride \par Continue Flomax \par \par 12/15/2020: The patient gave permission for a telephone visit. His PSA has been elevated and was 4.52 on 9/30/2020. He had an MRI on 12/9/2020 that showed there is an 0.8 x 0.8 cm polypoid lesion at the left bladder wall. This is consistent with bladder malignancy. 147 cc gland with mass effect on the bladder.\par No MRI suspicious prostate lesions. *PIRADS 1 - Very low (clinically significant cancer is highly unlikely to be present). Patient was never a smoker. \par \par 01/19/2021: The patient presents today for a cystoscopy for a mass found in the bladder on the MRI which also stated PIRADS-1. The patient denies hematuria. He is on a diuretic and has some urinary frequency. He reports stable urination patterns. THe patient recently had a renal sonogram due to an elevated creatinine. He reports his testicles are feeling good and no longer ache. The patient was concerned at the risk of the tumors found on cystoscopy being cancerous. \par \par 01/22/2021: The patient presents today to review the results of his most recent CT urogram. On his last visit he underwent a cystoscopy that showed right inferior bladder wall was deeply erythematous and had a carpet of papillary growth as well as two dome like papillary tumors, one larger about 2 cm in diameter and the other 0.8 cm in diameter. Both were suspicious for malignancy. I conferred with Dr.Eran Gallagher on his CT urogram from 1/20/2021. It showed enhancing right bladder wall mass in keeping with the clinical history of bladder tumor. No evidence of metastatic disease or suspicious lymphadenopathy in the abdomen and pelvis.Left renal cysts visualized. He appears good in the upper tracts. Enhancement seems confined to mucosa. No gross invasion of muscular layer. Prostate is very large. No ureteral or renal tumors in collecting system or parenchyma. Nocturia 2-3x. Denies any lower back pain. He takes aspirin every day and has 3 stents. \par \par 02/05/2021: The patient presents today for a fill and pull which was successful. He did not have any burning after. He recently underwent a TURBT with  on 2/2/2021. Pathology report from 2/2/2021 showed invasive high grade urothelial carcinoma.The carcinoma invades lamina propria. Muscularis propria is not involved. No lymphovascular invasion. Benign prostate tissue also seen. \par \par 02/12/2021: The patient presents today for a follow up. He reports gradual improvement overall. He is 10 days post op. Pt reports there is less blood when he urinates. He describes it as a little spout of blood when he first begins to void but then the rest of his stream is clear. He still feels some pressure when voiding but the discomfort has subsided. He denies any burning. He would like to put off the first BCG treatment for one more week. He reports a fever of 100.6 degrees Fahrenheit  last night that was relieved with Tylenol. Appetite is good and bowel movements are good. He originally had some constipation right after the procedure but that has resolved. \par \par 03/30/2021: Patient presents today for a follow up. His urine is currently perfectly clear. He is not on any aspirin or blood thinners. He took his first BCG treatment two weeks ago. He retained in his bladder for two hours. He had no adverse side effects. He stopped taking aspirin night before and two nights after the treatment. He has stents. He then started the aspirin two nights after and bled profusely, describing it at the deepest, darkest red he's ever seen. When he stopped taking the aspirin it started to clearer a little bit each day. Pt has drank a lot of water. He spoke with his cardiologist and he does not want him to be off the blood thinner completely. \par \par 06/07/2021: Patient presents today for a follow up. Patient has been undergoing BCG treatments. He had some troubles with infections and bleeding at first but he has gone 5 successive weeks without problems now. He has some burning for a day after treatment but nothing bothersome. He denies urinary urgency, frequency, or hematuria. Other than that he has had no side effects. He reports that when he took Levofloxacin, he noticed both times he develop a blister on his hand that was itchy and present for two days. Patient is scheduled for a cystoscopy towards the end of June. Urine cytology of 5/19/2021 was negative for high grade urothelial carcinoma. Creatinine was 1.3 on 12/7/2020. \par \par 06/10/2021: Pt presents today for a follow up. The urine cytology from 6/7/21 showed no signs of low grade or high grade tumors. The urinalysis and urine culture from 6/7/21 showed no significant results.  The patient reports good urination. He is currently undergoing BCG treatments. He is scheduled for another cystoscopy four lweeks from now and then BCG two weeks laterJune. He will resume the BCG treatments one week after his cystoscopy.\par \par 06/21/2021: Patient presents today for a cystoscopy. Pt tolerated the procedure well. He reports his urination has remained good and he denies any gross hematuria. Pt takes baby aspirin every day and has two stents covering 3 areas. Does not take any other blood thinners. Of note, patient will be traveling to [place] towards the end of July 2021. \par \par Cystoscopy: Lidocaine jelly was inserted for numbing and lubrication. The prostate is very large and there is a false passage way anteriorly. Urine in the bladder was turbid. Bladder became less turbid when filled with irrigation fluid. There is a bit of blood oozing from the prostate. In following the mucosa around, there is no tumor visible but vision is suboptimal. Anterior bladder looks good. Bladder capacity is about 350. Having evacuated the bladder twice, vision became more adequate. Upon retroflexing the cystoscope, there are no tumors seen. There is a little inflammation anteriorly in the form of cystitis glandularis. Little bolus edema at the bladder neck anteriorly. No strictures seen. The patient took one Bactrim tablet at the time of the procedure and will take one before bed. \par \par 07/22/2021: Patient presents today for cystoscopy. Tolerated procedure well. Denies dysuria, gross hematuria, urinary urgency. Feels well today and offers no new complaints. \par \par Cystoscopy findings: No strictures. Bladder mucosa is normal pale pink. No bladder stones. Scope is retroflexed. Little prostate nodule protruding into bladder a short distance. Previously resected prostate transurethrally. Bladder is 1+ trabeculated. R ureteral orifice is normal. Left ureteral orifice is a slit. No bladder tumors. Prostate previously resected and does not obstruct. No bladder neck contracture. Discrete verumontanum. No urethral strictures, tumors, or stones. \par \par Two tablets of Bactrim are provided. Patient is to take one tablet now and one tablet tonight to limit their risk of infection. Patient counseled on future surveillance and maintenance BCG.\par \par RTO for BCG. \par \par Preparation, in-person office visit, and coordination of care took: 20 minutes

## 2021-07-23 NOTE — HISTORY OF PRESENT ILLNESS
[FreeTextEntry1] : 12/15/2020:70 yr male with large BPH , testicular inflammation orchitis 5/19, resolved with abx. \par Doing well on finasteride and flomax . \par Nocturia 2-3x per night \par No blood in urine \par No more testicular pain or discomfort\par Psa 5/19 4.52,  5/20 4.63\par No FHx of prostate CA\par Patient is an  and works in his own firm. \par \par 12/15/2020: The patient gave permission for a telephone visit. His PSA has been elevated and was 4.52 on 9/30/2020. He had an MRI on 12/9/2020 that showed there is an 0.8 x 0.8 cm polypoid lesion at the left bladder wall. This is consistent with bladder malignancy. 147 cc gland with mass effect on the bladder.\par No MRI suspicious prostate lesions. *PIRADS 1 - Very low (clinically significant cancer is highly unlikely to be present). Patient was never a smoker. \par \par 01/19/2021: The patient presents today for a cystoscopy for a mass found in the bladder on the MRI which also stated PIRADS-1. The patient denies hematuria. He is on a diuretic and has some urinary frequency. He reports stable urination patterns. THe patient recently had a renal sonogram due to an elevated creatinine. He reports his testicles are feeling good and no longer ache. The patient was concerned at the risk of the tumors found on cystoscopy being cancerous. \par \par 01/22/2021: The patient presents today to review the results of his most recent CT urogram. On his last visit he underwent a cystoscopy that showed right inferior bladder wall was deeply erythematous and had a carpet of papillary growth as well as two dome like papillary tumors, one larger about 2 cm in diameter and the other 0.8 cm in diameter. Both were suspicious for malignancy. I conferred with Dr.Eran Gallagher on his CT urogram from 1/20/2021. It showed enhancing right bladder wall mass in keeping with the clinical history of bladder tumor. No evidence of metastatic disease or suspicious lymphadenopathy in the abdomen and pelvis.Left renal cysts visualized. He appears good in the upper tracts. Enhancement seems confined to mucosa. No gross invasion of muscular layer. Prostate is very large. No ureteral or renal tumors in collecting system or parenchyma. Nocturia 2-3x. Denies any lower back pain. He takes aspirin every day and has 3 stents. \par \par 02/05/2021: The patient presents today for a fill and pull which was successful. He did not have any burning after. He recently underwent a TURBT with  on 2/2/2021. Pathology report from 2/2/2021 showed invasive high grade urothelial carcinoma.The carcinoma invades lamina propria. Muscularis propria is not involved. No lymphovascular invasion. Benign prostate tissue also seen. \par \par 02/12/2021: The patient presents today for a follow up. He reports gradual improvement overall. He is 10 days post op. Pt reports there is less blood when he urinates. He describes it as a little spout of blood when he first begins to void but then the rest of his stream is clear. He still feels some pressure when voiding but the discomfort has subsided. He denies any burning. He would like to put off the first BCG treatment for one more week. He reports a fever of 100.6 degrees Fahrenheit  last night that was relieved with Tylenol. Appetite is good and bowel movements are good. He originally had some constipation right after the procedure but that has resolved. \par \par 03/30/2021: Patient presents today for a follow up. His urine is currently perfectly clear. He is not on any aspirin or blood thinners. He took his first BCG treatment two weeks ago. He retained in his bladder for two hours. He had no adverse side effects. He stopped taking aspirin night before and two nights after the treatment. He has stents. He then started the aspirin two nights after and bled profusely, describing it at the deepest, darkest red he's ever seen. When he stopped taking the aspirin it started to clearer a little bit each day. Pt has drank a lot of water. He spoke with his cardiologist and he does not want him to be off the blood thinner completely. \par \par 06/07/2021: Patient presents today for a follow up. Patient has been undergoing BCG treatments. He had some troubles with infections and bleeding at first but he has gone 5 successive weeks without problems now. He has some burning for a day after treatment but nothing bothersome. He denies urinary urgency, frequency, or hematuria. Other than that he has had no side effects. He reports that when he took Levofloxacin, he noticed both times he develop a blister on his hand that was itchy and present for two days. Patient is scheduled for a cystoscopy towards the end of June. Urine cytology of 5/19/2021 was negative for high grade urothelial carcinoma. Creatinine was 1.3 on 12/7/2020.\par \par 06/10/2021: Pt presents today for a follow up. The urine cytology from 6/7/21 showed no signs of low grade or high grade tumors. The urinalysis and urine culture from 6/7/21 showed no significant results.  The patient reports good urination. He is currently undergoing BCG treatments. He is scheduled for a cystoscopy for later in June. He will resume the BCG treatments one week after his cystoscopy.\par \par 06/21/2021: Patient presents today for a cystoscopy. Pt tolerated the procedure well. He reports his urination has remained good and he denies any gross hematuria. Pt takes baby aspirin every day and has two stents covering 3 areas. Does not take any other blood thinners. Of note, patient will be traveling to [place] towards the end of July 2021.\par \par 07/22/2021: Patient presents today for cystoscopy. Tolerated procedure well. Denies dysuria, gross hematuria, urinary urgency. Feels well today and offers no new complaints.

## 2021-07-23 NOTE — ADDENDUM
[FreeTextEntry1] : I, Sirena Buchananin, acted solely as a scribe for Dr. Sami Mariano on this date 07/22/2021.\par \par All medical record entries made by the Scribe were at my, Dr. Sami Mariano, direction and personally dictated by me on 07/22/2021. I have reviewed the chart and agree that the record accurately reflects my personal performance of the history, physical exam, assessment and plan.  I have also personally directed, reviewed and agreed with the chart.

## 2021-07-25 LAB
APPEARANCE: CLEAR
BACTERIA UR CULT: NORMAL
BACTERIA: NEGATIVE
BILIRUBIN URINE: NEGATIVE
BLOOD URINE: NEGATIVE
COLOR: NORMAL
GLUCOSE QUALITATIVE U: NEGATIVE
HYALINE CASTS: 1 /LPF
KETONES URINE: NEGATIVE
LEUKOCYTE ESTERASE URINE: NEGATIVE
MICROSCOPIC-UA: NORMAL
NITRITE URINE: NEGATIVE
PH URINE: 6
PROTEIN URINE: NEGATIVE
RED BLOOD CELLS URINE: 2 /HPF
SPECIFIC GRAVITY URINE: 1.02
SQUAMOUS EPITHELIAL CELLS: 1 /HPF
URINE CYTOLOGY: NORMAL
UROBILINOGEN URINE: NORMAL
WHITE BLOOD CELLS URINE: 3 /HPF

## 2021-08-02 ENCOUNTER — NON-APPOINTMENT (OUTPATIENT)
Age: 71
End: 2021-08-02

## 2021-08-05 ENCOUNTER — APPOINTMENT (OUTPATIENT)
Dept: UROLOGY | Facility: CLINIC | Age: 71
End: 2021-08-05
Payer: MEDICARE

## 2021-08-05 VITALS
DIASTOLIC BLOOD PRESSURE: 84 MMHG | BODY MASS INDEX: 25.11 KG/M2 | HEART RATE: 85 BPM | SYSTOLIC BLOOD PRESSURE: 144 MMHG | WEIGHT: 160 LBS | HEIGHT: 67 IN | RESPIRATION RATE: 18 BRPM

## 2021-08-05 DIAGNOSIS — R35.0 FREQUENCY OF MICTURITION: ICD-10-CM

## 2021-08-05 PROCEDURE — 51720 TREATMENT OF BLADDER LESION: CPT

## 2021-08-05 PROCEDURE — 99213 OFFICE O/P EST LOW 20 MIN: CPT | Mod: 25

## 2021-08-08 NOTE — ADDENDUM
[FreeTextEntry1] : I, Sirena Buchananin, acted solely as a scribe for Dr. Sami Mariano on this date 08/05/2021.\par \par All medical record entries made by the Scribe were at my, Dr. Sami Mariano, direction and personally dictated by me on 08/05/2021. I have reviewed the chart and agree that the record accurately reflects my personal performance of the history, physical exam, assessment and plan.  I have also personally directed, reviewed and agreed with the chart.

## 2021-08-08 NOTE — ASSESSMENT
[FreeTextEntry1] : 12/15/2020: 71 yr male with BPH, PSA elevation , LUTs nocturia .  Acute left orchitis May 2019 resolved. \par Following for PSA elevation to rule out prostatic neoplasm\par \par patient concerned for prostate cancer preventive surveillance .  Asking if he was due for another MRI prostate.  Last MRI prostate 2016\par Knee chest position was used for digital rectal exam. No suspicious rectal masses. No rectal mucosal lesions. Anal tone is normal. The prostate is non tender, with normal texture, discrete borders, and no nodules. It is a 45 gram transurethral resection size. Prostate is wide. No gross blood on examining fingers. \par Repeat prostate mri\par PSA\par Alkaline phosphate\par UA micros\par Doing well on finasteride and Flomax.  Has retrograde ejaculation but tolerable symptoms per patient. \par Continue finasteride \par Continue Flomax \par \par 12/15/2020: The patient gave permission for a telephone visit. His PSA has been elevated and was 4.52 on 9/30/2020. He had an MRI on 12/9/2020 that showed there is an 0.8 x 0.8 cm polypoid lesion at the left bladder wall. This is consistent with bladder malignancy. 147 cc gland with mass effect on the bladder.\par No MRI suspicious prostate lesions. *PIRADS 1 - Very low (clinically significant cancer is highly unlikely to be present). Patient was never a smoker. \par \par 01/19/2021: The patient presents today for a cystoscopy for a mass found in the bladder on the MRI which also stated PIRADS-1. The patient denies hematuria. He is on a diuretic and has some urinary frequency. He reports stable urination patterns. THe patient recently had a renal sonogram due to an elevated creatinine. He reports his testicles are feeling good and no longer ache. The patient was concerned at the risk of the tumors found on cystoscopy being cancerous. \par \par 01/22/2021: The patient presents today to review the results of his most recent CT urogram. On his last visit he underwent a cystoscopy that showed right inferior bladder wall was deeply erythematous and had a carpet of papillary growth as well as two dome like papillary tumors, one larger about 2 cm in diameter and the other 0.8 cm in diameter. Both were suspicious for malignancy. I conferred with Dr.Eran Gallagher on his CT urogram from 1/20/2021. It showed enhancing right bladder wall mass in keeping with the clinical history of bladder tumor. No evidence of metastatic disease or suspicious lymphadenopathy in the abdomen and pelvis.Left renal cysts visualized. He appears good in the upper tracts. Enhancement seems confined to mucosa. No gross invasion of muscular layer. Prostate is very large. No ureteral or renal tumors in collecting system or parenchyma. Nocturia 2-3x. Denies any lower back pain. He takes aspirin every day and has 3 stents. \par \par 02/05/2021: The patient presents today for a fill and pull which was successful. He did not have any burning after. He recently underwent a TURBT with  on 2/2/2021. Pathology report from 2/2/2021 showed invasive high grade urothelial carcinoma.The carcinoma invades lamina propria. Muscularis propria is not involved. No lymphovascular invasion. Benign prostate tissue also seen. \par \par 02/12/2021: The patient presents today for a follow up. He reports gradual improvement overall. He is 10 days post op. Pt reports there is less blood when he urinates. He describes it as a little spout of blood when he first begins to void but then the rest of his stream is clear. He still feels some pressure when voiding but the discomfort has subsided. He denies any burning. He would like to put off the first BCG treatment for one more week. He reports a fever of 100.6 degrees Fahrenheit  last night that was relieved with Tylenol. Appetite is good and bowel movements are good. He originally had some constipation right after the procedure but that has resolved. \par \par 03/30/2021: Patient presents today for a follow up. His urine is currently perfectly clear. He is not on any aspirin or blood thinners. He took his first BCG treatment two weeks ago. He retained in his bladder for two hours. He had no adverse side effects. He stopped taking aspirin night before and two nights after the treatment. He has stents. He then started the aspirin two nights after and bled profusely, describing it at the deepest, darkest red he's ever seen. When he stopped taking the aspirin it started to clearer a little bit each day. Pt has drank a lot of water. He spoke with his cardiologist and he does not want him to be off the blood thinner completely. \par \par 06/07/2021: Patient presents today for a follow up. Patient has been undergoing BCG treatments. He had some troubles with infections and bleeding at first but he has gone 5 successive weeks without problems now. He has some burning for a day after treatment but nothing bothersome. He denies urinary urgency, frequency, or hematuria. Other than that he has had no side effects. He reports that when he took Levofloxacin, he noticed both times he develop a blister on his hand that was itchy and present for two days. Patient is scheduled for a cystoscopy towards the end of June. Urine cytology of 5/19/2021 was negative for high grade urothelial carcinoma. Creatinine was 1.3 on 12/7/2020. \par \par 06/10/2021: Pt presents today for a follow up. The urine cytology from 6/7/21 showed no signs of low grade or high grade tumors. The urinalysis and urine culture from 6/7/21 showed no significant results.  The patient reports good urination. He is currently undergoing BCG treatments. He is scheduled for another cystoscopy four lweeks from now and then BCG two weeks laterJune. He will resume the BCG treatments one week after his cystoscopy.\par \par 06/21/2021: Patient presents today for a cystoscopy. Pt tolerated the procedure well. He reports his urination has remained good and he denies any gross hematuria. Pt takes baby aspirin every day and has two stents covering 3 areas. Does not take any other blood thinners. Of note, patient will be traveling to [place] towards the end of July 2021. \par \par 07/22/2021: Patient presents today for cystoscopy. Tolerated procedure well. Denies dysuria, gross hematuria, urinary urgency. Feels well today and offers no new complaints. \par \par 08/05/2021: Patient presents today for BCG. Feels well. Denies dysuria, gross hematuria. Has cardiac stents. Reviewed 7/22/21 urine cytology which showed no cancerous urinary cells. On 7/3/21, UA and urine culture was normal. States he has tendon pains when he takes Levofloxacin. \par \par RTO in 1 week for BCG. \par \par Preparation, in-person office visit, and coordination of care took: 20 minutes

## 2021-08-08 NOTE — REVIEW OF SYSTEMS
[Constipation] : constipation [Heartburn] : heartburn [Urine Is Cloudy] : cloudy urine [Dysuria] : no dysuria [Feeling Poorly] : not feeling poorly [Difficulty Walking] : no difficulty walking

## 2021-08-08 NOTE — HISTORY OF PRESENT ILLNESS
[FreeTextEntry1] : 12/15/2020:70 yr male with large BPH , testicular inflammation orchitis 5/19, resolved with abx. \par Doing well on finasteride and flomax . \par Nocturia 2-3x per night \par No blood in urine \par No more testicular pain or discomfort\par Psa 5/19 4.52,  5/20 4.63\par No FHx of prostate CA\par Patient is an  and works in his own firm. \par \par 12/15/2020: The patient gave permission for a telephone visit. His PSA has been elevated and was 4.52 on 9/30/2020. He had an MRI on 12/9/2020 that showed there is an 0.8 x 0.8 cm polypoid lesion at the left bladder wall. This is consistent with bladder malignancy. 147 cc gland with mass effect on the bladder.\par No MRI suspicious prostate lesions. *PIRADS 1 - Very low (clinically significant cancer is highly unlikely to be present). Patient was never a smoker. \par \par 01/19/2021: The patient presents today for a cystoscopy for a mass found in the bladder on the MRI which also stated PIRADS-1. The patient denies hematuria. He is on a diuretic and has some urinary frequency. He reports stable urination patterns. THe patient recently had a renal sonogram due to an elevated creatinine. He reports his testicles are feeling good and no longer ache. The patient was concerned at the risk of the tumors found on cystoscopy being cancerous. \par \par 01/22/2021: The patient presents today to review the results of his most recent CT urogram. On his last visit he underwent a cystoscopy that showed right inferior bladder wall was deeply erythematous and had a carpet of papillary growth as well as two dome like papillary tumors, one larger about 2 cm in diameter and the other 0.8 cm in diameter. Both were suspicious for malignancy. I conferred with Dr.Eran Gallagher on his CT urogram from 1/20/2021. It showed enhancing right bladder wall mass in keeping with the clinical history of bladder tumor. No evidence of metastatic disease or suspicious lymphadenopathy in the abdomen and pelvis.Left renal cysts visualized. He appears good in the upper tracts. Enhancement seems confined to mucosa. No gross invasion of muscular layer. Prostate is very large. No ureteral or renal tumors in collecting system or parenchyma. Nocturia 2-3x. Denies any lower back pain. He takes aspirin every day and has 3 stents. \par \par 02/05/2021: The patient presents today for a fill and pull which was successful. He did not have any burning after. He recently underwent a TURBT with  on 2/2/2021. Pathology report from 2/2/2021 showed invasive high grade urothelial carcinoma.The carcinoma invades lamina propria. Muscularis propria is not involved. No lymphovascular invasion. Benign prostate tissue also seen. \par \par 02/12/2021: The patient presents today for a follow up. He reports gradual improvement overall. He is 10 days post op. Pt reports there is less blood when he urinates. He describes it as a little spout of blood when he first begins to void but then the rest of his stream is clear. He still feels some pressure when voiding but the discomfort has subsided. He denies any burning. He would like to put off the first BCG treatment for one more week. He reports a fever of 100.6 degrees Fahrenheit  last night that was relieved with Tylenol. Appetite is good and bowel movements are good. He originally had some constipation right after the procedure but that has resolved. \par \par 03/30/2021: Patient presents today for a follow up. His urine is currently perfectly clear. He is not on any aspirin or blood thinners. He took his first BCG treatment two weeks ago. He retained in his bladder for two hours. He had no adverse side effects. He stopped taking aspirin night before and two nights after the treatment. He has stents. He then started the aspirin two nights after and bled profusely, describing it at the deepest, darkest red he's ever seen. When he stopped taking the aspirin it started to clearer a little bit each day. Pt has drank a lot of water. He spoke with his cardiologist and he does not want him to be off the blood thinner completely. \par \par 06/07/2021: Patient presents today for a follow up. Patient has been undergoing BCG treatments. He had some troubles with infections and bleeding at first but he has gone 5 successive weeks without problems now. He has some burning for a day after treatment but nothing bothersome. He denies urinary urgency, frequency, or hematuria. Other than that he has had no side effects. He reports that when he took Levofloxacin, he noticed both times he develop a blister on his hand that was itchy and present for two days. Patient is scheduled for a cystoscopy towards the end of June. Urine cytology of 5/19/2021 was negative for high grade urothelial carcinoma. Creatinine was 1.3 on 12/7/2020.\par \par 06/10/2021: Pt presents today for a follow up. The urine cytology from 6/7/21 showed no signs of low grade or high grade tumors. The urinalysis and urine culture from 6/7/21 showed no significant results.  The patient reports good urination. He is currently undergoing BCG treatments. He is scheduled for a cystoscopy for later in June. He will resume the BCG treatments one week after his cystoscopy.\par \par 06/21/2021: Patient presents today for a cystoscopy. Pt tolerated the procedure well. He reports his urination has remained good and he denies any gross hematuria. Pt takes baby aspirin every day and has two stents covering 3 areas. Does not take any other blood thinners. Of note, patient will be traveling to [place] towards the end of July 2021.\par \par 07/22/2021: Patient presents today for cystoscopy. Tolerated procedure well. Denies dysuria, gross hematuria, urinary urgency. Feels well today and offers no new complaints. \par \par 08/05/2021: Patient presents today for BCG. Feels well. Denies dysuria, gross hematuria. Has cardiac stents. Reviewed 7/22/21 urine cytology which showed no cancerous urinary cells. On 7/3/21, UA and urine culture was normal. States he has tendon pains when he takes Levofloxacin.

## 2021-08-12 ENCOUNTER — APPOINTMENT (OUTPATIENT)
Dept: UROLOGY | Facility: CLINIC | Age: 71
End: 2021-08-12

## 2021-08-12 ENCOUNTER — OUTPATIENT (OUTPATIENT)
Dept: OUTPATIENT SERVICES | Facility: HOSPITAL | Age: 71
LOS: 1 days | End: 2021-08-12
Payer: MEDICARE

## 2021-08-12 ENCOUNTER — APPOINTMENT (OUTPATIENT)
Dept: UROLOGY | Facility: CLINIC | Age: 71
End: 2021-08-12
Payer: MEDICARE

## 2021-08-12 VITALS
OXYGEN SATURATION: 99 % | SYSTOLIC BLOOD PRESSURE: 159 MMHG | HEIGHT: 67 IN | WEIGHT: 160 LBS | TEMPERATURE: 98 F | DIASTOLIC BLOOD PRESSURE: 91 MMHG | BODY MASS INDEX: 25.11 KG/M2 | HEART RATE: 87 BPM | RESPIRATION RATE: 17 BRPM

## 2021-08-12 VITALS — RESPIRATION RATE: 17 BRPM | HEART RATE: 59 BPM | DIASTOLIC BLOOD PRESSURE: 83 MMHG | SYSTOLIC BLOOD PRESSURE: 163 MMHG

## 2021-08-12 DIAGNOSIS — C67.9 MALIGNANT NEOPLASM OF BLADDER, UNSPECIFIED: ICD-10-CM

## 2021-08-12 DIAGNOSIS — Z95.5 PRESENCE OF CORONARY ANGIOPLASTY IMPLANT AND GRAFT: Chronic | ICD-10-CM

## 2021-08-12 DIAGNOSIS — Z98.1 ARTHRODESIS STATUS: Chronic | ICD-10-CM

## 2021-08-12 DIAGNOSIS — Z98.890 OTHER SPECIFIED POSTPROCEDURAL STATES: Chronic | ICD-10-CM

## 2021-08-12 DIAGNOSIS — R35.0 FREQUENCY OF MICTURITION: ICD-10-CM

## 2021-08-12 PROCEDURE — 51720 TREATMENT OF BLADDER LESION: CPT

## 2021-08-12 RX ORDER — BACILLUS CALMETTE-GUERIN 50 MG/50ML
50 POWDER, FOR SUSPENSION INTRAVESICAL
Qty: 1 | Refills: 0 | Status: COMPLETED | OUTPATIENT
Start: 2021-08-12 | End: 2021-08-12

## 2021-08-12 RX ORDER — BACILLUS CALMETTE-GUERIN 50 MG/50ML
50 POWDER, FOR SUSPENSION INTRAVESICAL
Qty: 0 | Refills: 0 | Status: COMPLETED | OUTPATIENT
Start: 2021-08-12

## 2021-08-12 RX ADMIN — BACILLUS CALMETTE-GUERIN 0 MG: 50 POWDER, FOR SUSPENSION INTRAVESICAL at 00:00

## 2021-08-19 ENCOUNTER — APPOINTMENT (OUTPATIENT)
Dept: UROLOGY | Facility: CLINIC | Age: 71
End: 2021-08-19
Payer: MEDICARE

## 2021-08-19 ENCOUNTER — OUTPATIENT (OUTPATIENT)
Dept: OUTPATIENT SERVICES | Facility: HOSPITAL | Age: 71
LOS: 1 days | End: 2021-08-19
Payer: MEDICARE

## 2021-08-19 ENCOUNTER — APPOINTMENT (OUTPATIENT)
Dept: UROLOGY | Facility: CLINIC | Age: 71
End: 2021-08-19

## 2021-08-19 VITALS — SYSTOLIC BLOOD PRESSURE: 144 MMHG | HEART RATE: 88 BPM | DIASTOLIC BLOOD PRESSURE: 78 MMHG

## 2021-08-19 VITALS
HEIGHT: 67 IN | HEART RATE: 59 BPM | TEMPERATURE: 98 F | DIASTOLIC BLOOD PRESSURE: 78 MMHG | BODY MASS INDEX: 25.11 KG/M2 | WEIGHT: 160 LBS | SYSTOLIC BLOOD PRESSURE: 124 MMHG

## 2021-08-19 DIAGNOSIS — N32.89 OTHER SPECIFIED DISORDERS OF BLADDER: ICD-10-CM

## 2021-08-19 DIAGNOSIS — C67.9 MALIGNANT NEOPLASM OF BLADDER, UNSPECIFIED: ICD-10-CM

## 2021-08-19 DIAGNOSIS — R35.0 FREQUENCY OF MICTURITION: ICD-10-CM

## 2021-08-19 DIAGNOSIS — Z98.1 ARTHRODESIS STATUS: Chronic | ICD-10-CM

## 2021-08-19 DIAGNOSIS — Z95.5 PRESENCE OF CORONARY ANGIOPLASTY IMPLANT AND GRAFT: Chronic | ICD-10-CM

## 2021-08-19 DIAGNOSIS — Z98.890 OTHER SPECIFIED POSTPROCEDURAL STATES: Chronic | ICD-10-CM

## 2021-08-19 PROCEDURE — 51720 TREATMENT OF BLADDER LESION: CPT

## 2021-08-19 RX ORDER — BACILLUS CALMETTE-GUERIN 50 MG/50ML
50 POWDER, FOR SUSPENSION INTRAVESICAL
Qty: 1 | Refills: 0 | Status: COMPLETED | OUTPATIENT
Start: 2021-08-19 | End: 2021-08-19

## 2021-08-19 RX ORDER — BACILLUS CALMETTE-GUERIN 50 MG/50ML
50 POWDER, FOR SUSPENSION INTRAVESICAL
Qty: 0 | Refills: 0 | Status: COMPLETED | OUTPATIENT
Start: 2021-08-19

## 2021-08-19 RX ADMIN — BACILLUS CALMETTE-GUERIN 0 MG: 50 POWDER, FOR SUSPENSION INTRAVESICAL at 00:00

## 2021-08-21 PROBLEM — C67.9 BLADDER CANCER: Noted: 2021-03-11

## 2021-08-21 PROBLEM — N32.89 BLADDER MASS: Noted: 2020-12-15

## 2021-09-01 DIAGNOSIS — C67.8 MALIGNANT NEOPLASM OF OVERLAPPING SITES OF BLADDER: ICD-10-CM

## 2021-10-21 ENCOUNTER — APPOINTMENT (OUTPATIENT)
Dept: UROLOGY | Facility: CLINIC | Age: 71
End: 2021-10-21
Payer: MEDICARE

## 2021-10-21 ENCOUNTER — APPOINTMENT (OUTPATIENT)
Dept: UROLOGY | Facility: CLINIC | Age: 71
End: 2021-10-21

## 2021-10-21 ENCOUNTER — OUTPATIENT (OUTPATIENT)
Dept: OUTPATIENT SERVICES | Facility: HOSPITAL | Age: 71
LOS: 1 days | End: 2021-10-21
Payer: MEDICARE

## 2021-10-21 VITALS
WEIGHT: 160 LBS | DIASTOLIC BLOOD PRESSURE: 80 MMHG | BODY MASS INDEX: 25.11 KG/M2 | HEART RATE: 58 BPM | HEIGHT: 67 IN | SYSTOLIC BLOOD PRESSURE: 157 MMHG

## 2021-10-21 DIAGNOSIS — Z95.5 PRESENCE OF CORONARY ANGIOPLASTY IMPLANT AND GRAFT: Chronic | ICD-10-CM

## 2021-10-21 DIAGNOSIS — N40.1 BENIGN PROSTATIC HYPERPLASIA WITH LOWER URINARY TRACT SYMPTOMS: ICD-10-CM

## 2021-10-21 DIAGNOSIS — Z98.890 OTHER SPECIFIED POSTPROCEDURAL STATES: Chronic | ICD-10-CM

## 2021-10-21 DIAGNOSIS — R35.0 FREQUENCY OF MICTURITION: ICD-10-CM

## 2021-10-21 DIAGNOSIS — Z98.1 ARTHRODESIS STATUS: Chronic | ICD-10-CM

## 2021-10-21 DIAGNOSIS — C67.8 MALIGNANT NEOPLASM OF OVERLAPPING SITES OF BLADDER: ICD-10-CM

## 2021-10-21 PROCEDURE — 99213 OFFICE O/P EST LOW 20 MIN: CPT | Mod: 25

## 2021-10-21 PROCEDURE — 52000 CYSTOURETHROSCOPY: CPT

## 2021-10-21 PROCEDURE — 88112 CYTOPATH CELL ENHANCE TECH: CPT | Mod: 26

## 2021-10-25 LAB — URINE CYTOLOGY: NORMAL

## 2021-10-28 ENCOUNTER — APPOINTMENT (OUTPATIENT)
Dept: UROLOGY | Facility: CLINIC | Age: 71
End: 2021-10-28
Payer: MEDICARE

## 2021-10-28 ENCOUNTER — OUTPATIENT (OUTPATIENT)
Dept: OUTPATIENT SERVICES | Facility: HOSPITAL | Age: 71
LOS: 1 days | End: 2021-10-28
Payer: MEDICARE

## 2021-10-28 VITALS
TEMPERATURE: 97.5 F | DIASTOLIC BLOOD PRESSURE: 85 MMHG | HEART RATE: 59 BPM | RESPIRATION RATE: 16 BRPM | SYSTOLIC BLOOD PRESSURE: 142 MMHG

## 2021-10-28 VITALS — SYSTOLIC BLOOD PRESSURE: 135 MMHG | DIASTOLIC BLOOD PRESSURE: 76 MMHG | HEART RATE: 53 BPM

## 2021-10-28 DIAGNOSIS — Z98.1 ARTHRODESIS STATUS: Chronic | ICD-10-CM

## 2021-10-28 DIAGNOSIS — R35.0 FREQUENCY OF MICTURITION: ICD-10-CM

## 2021-10-28 DIAGNOSIS — Z98.890 OTHER SPECIFIED POSTPROCEDURAL STATES: Chronic | ICD-10-CM

## 2021-10-28 DIAGNOSIS — C67.8 MALIGNANT NEOPLASM OF OVERLAPPING SITES OF BLADDER: ICD-10-CM

## 2021-10-28 DIAGNOSIS — Z95.5 PRESENCE OF CORONARY ANGIOPLASTY IMPLANT AND GRAFT: Chronic | ICD-10-CM

## 2021-10-28 PROCEDURE — 51720 TREATMENT OF BLADDER LESION: CPT

## 2021-10-28 RX ORDER — BACILLUS CALMETTE-GUERIN 50 MG/50ML
50 POWDER, FOR SUSPENSION INTRAVESICAL
Qty: 1 | Refills: 0 | Status: COMPLETED | OUTPATIENT
Start: 2021-10-28 | End: 2021-10-28

## 2021-10-28 RX ADMIN — BACILLUS CALMETTE-GUERIN 0 MG: 50 POWDER, FOR SUSPENSION INTRAVESICAL at 00:00

## 2021-11-04 ENCOUNTER — APPOINTMENT (OUTPATIENT)
Dept: UROLOGY | Facility: CLINIC | Age: 71
End: 2021-11-04
Payer: MEDICARE

## 2021-11-04 ENCOUNTER — OUTPATIENT (OUTPATIENT)
Dept: OUTPATIENT SERVICES | Facility: HOSPITAL | Age: 71
LOS: 1 days | End: 2021-11-04
Payer: MEDICARE

## 2021-11-04 VITALS — SYSTOLIC BLOOD PRESSURE: 125 MMHG | HEART RATE: 77 BPM | DIASTOLIC BLOOD PRESSURE: 78 MMHG

## 2021-11-04 DIAGNOSIS — Z98.1 ARTHRODESIS STATUS: Chronic | ICD-10-CM

## 2021-11-04 DIAGNOSIS — C67.8 MALIGNANT NEOPLASM OF OVERLAPPING SITES OF BLADDER: ICD-10-CM

## 2021-11-04 DIAGNOSIS — Z98.890 OTHER SPECIFIED POSTPROCEDURAL STATES: Chronic | ICD-10-CM

## 2021-11-04 DIAGNOSIS — Z95.5 PRESENCE OF CORONARY ANGIOPLASTY IMPLANT AND GRAFT: Chronic | ICD-10-CM

## 2021-11-04 DIAGNOSIS — R35.0 FREQUENCY OF MICTURITION: ICD-10-CM

## 2021-11-04 PROCEDURE — 51720 TREATMENT OF BLADDER LESION: CPT

## 2021-11-04 RX ORDER — BACILLUS CALMETTE-GUERIN 50 MG/50ML
50 POWDER, FOR SUSPENSION INTRAVESICAL
Qty: 1 | Refills: 0 | Status: COMPLETED | OUTPATIENT
Start: 2021-11-04 | End: 2021-11-04

## 2021-11-04 RX ORDER — LEVOFLOXACIN 500 MG/1
500 TABLET, FILM COATED ORAL DAILY
Qty: 10 | Refills: 0 | Status: DISCONTINUED | COMMUNITY
Start: 2021-05-22 | End: 2021-11-04

## 2021-11-04 RX ORDER — SULFAMETHOXAZOLE AND TRIMETHOPRIM 800; 160 MG/1; MG/1
800-160 TABLET ORAL TWICE DAILY
Qty: 20 | Refills: 0 | Status: DISCONTINUED | COMMUNITY
Start: 2021-04-05 | End: 2021-11-04

## 2021-11-04 RX ADMIN — BACILLUS CALMETTE-GUERIN 0 MG: 50 POWDER, FOR SUSPENSION INTRAVESICAL at 00:00

## 2021-11-11 ENCOUNTER — APPOINTMENT (OUTPATIENT)
Dept: UROLOGY | Facility: CLINIC | Age: 71
End: 2021-11-11

## 2021-11-12 ENCOUNTER — APPOINTMENT (OUTPATIENT)
Dept: UROLOGY | Facility: CLINIC | Age: 71
End: 2021-11-12
Payer: MEDICARE

## 2021-11-12 ENCOUNTER — OUTPATIENT (OUTPATIENT)
Dept: OUTPATIENT SERVICES | Facility: HOSPITAL | Age: 71
LOS: 1 days | End: 2021-11-12
Payer: MEDICARE

## 2021-11-12 VITALS — DIASTOLIC BLOOD PRESSURE: 79 MMHG | HEART RATE: 52 BPM | SYSTOLIC BLOOD PRESSURE: 137 MMHG

## 2021-11-12 DIAGNOSIS — R35.0 FREQUENCY OF MICTURITION: ICD-10-CM

## 2021-11-12 DIAGNOSIS — Z95.5 PRESENCE OF CORONARY ANGIOPLASTY IMPLANT AND GRAFT: Chronic | ICD-10-CM

## 2021-11-12 DIAGNOSIS — Z87.440 PERSONAL HISTORY OF URINARY (TRACT) INFECTIONS: ICD-10-CM

## 2021-11-12 DIAGNOSIS — Z98.890 OTHER SPECIFIED POSTPROCEDURAL STATES: Chronic | ICD-10-CM

## 2021-11-12 DIAGNOSIS — Z98.1 ARTHRODESIS STATUS: Chronic | ICD-10-CM

## 2021-11-12 PROCEDURE — 51720 TREATMENT OF BLADDER LESION: CPT

## 2021-11-12 RX ORDER — BACILLUS CALMETTE-GUERIN 50 MG/50ML
50 POWDER, FOR SUSPENSION INTRAVESICAL
Qty: 1 | Refills: 0 | Status: COMPLETED | OUTPATIENT
Start: 2021-11-12 | End: 2021-11-12

## 2021-11-12 RX ORDER — TAMSULOSIN HYDROCHLORIDE 0.4 MG/1
0.4 CAPSULE ORAL
Qty: 180 | Refills: 3 | Status: ACTIVE | OUTPATIENT
Start: 2019-05-09

## 2021-11-12 RX ADMIN — BACILLUS CALMETTE-GUERIN 0 MG: 50 POWDER, FOR SUSPENSION INTRAVESICAL at 00:00

## 2021-11-13 ENCOUNTER — RX RENEWAL (OUTPATIENT)
Age: 71
End: 2021-11-13

## 2021-11-17 DIAGNOSIS — C67.8 MALIGNANT NEOPLASM OF OVERLAPPING SITES OF BLADDER: ICD-10-CM

## 2021-11-26 ENCOUNTER — TRANSCRIPTION ENCOUNTER (OUTPATIENT)
Age: 71
End: 2021-11-26

## 2021-12-01 ENCOUNTER — APPOINTMENT (OUTPATIENT)
Dept: DISASTER EMERGENCY | Facility: HOSPITAL | Age: 71
End: 2021-12-01

## 2022-02-10 ENCOUNTER — OUTPATIENT (OUTPATIENT)
Dept: OUTPATIENT SERVICES | Facility: HOSPITAL | Age: 72
LOS: 1 days | End: 2022-02-10
Payer: MEDICARE

## 2022-02-10 ENCOUNTER — APPOINTMENT (OUTPATIENT)
Dept: UROLOGY | Facility: CLINIC | Age: 72
End: 2022-02-10
Payer: MEDICARE

## 2022-02-10 VITALS
HEIGHT: 67 IN | WEIGHT: 160 LBS | SYSTOLIC BLOOD PRESSURE: 134 MMHG | HEART RATE: 56 BPM | RESPIRATION RATE: 17 BRPM | TEMPERATURE: 98.1 F | BODY MASS INDEX: 25.11 KG/M2 | OXYGEN SATURATION: 99 % | DIASTOLIC BLOOD PRESSURE: 81 MMHG

## 2022-02-10 DIAGNOSIS — N40.1 BENIGN PROSTATIC HYPERPLASIA WITH LOWER URINARY TRACT SYMPTOMS: ICD-10-CM

## 2022-02-10 DIAGNOSIS — Z98.890 OTHER SPECIFIED POSTPROCEDURAL STATES: Chronic | ICD-10-CM

## 2022-02-10 DIAGNOSIS — C67.8 MALIGNANT NEOPLASM OF OVERLAPPING SITES OF BLADDER: ICD-10-CM

## 2022-02-10 DIAGNOSIS — Z95.5 PRESENCE OF CORONARY ANGIOPLASTY IMPLANT AND GRAFT: Chronic | ICD-10-CM

## 2022-02-10 DIAGNOSIS — R35.0 FREQUENCY OF MICTURITION: ICD-10-CM

## 2022-02-10 DIAGNOSIS — Z98.1 ARTHRODESIS STATUS: Chronic | ICD-10-CM

## 2022-02-10 DIAGNOSIS — N13.8 OTHER OBSTRUCTIVE AND REFLUX UROPATHY: ICD-10-CM

## 2022-02-10 PROCEDURE — 88112 CYTOPATH CELL ENHANCE TECH: CPT | Mod: 26

## 2022-02-10 PROCEDURE — 52000 CYSTOURETHROSCOPY: CPT

## 2022-02-10 PROCEDURE — 99213 OFFICE O/P EST LOW 20 MIN: CPT | Mod: 25

## 2022-02-10 RX ORDER — DOXYCYCLINE HYCLATE 100 MG/1
100 CAPSULE ORAL
Qty: 28 | Refills: 0 | Status: DISCONTINUED | COMMUNITY
Start: 2021-07-06 | End: 2022-02-10

## 2022-02-10 NOTE — ASSESSMENT
[FreeTextEntry1] : I performed a cystoscopy today in the office to reassess the bladder.  It shows the evidence of prior tumor resection but there was no evidence of any recurrent papillary lesions or focal erythema to suggest a recurrence of malignancy within the bladder.  He does have BPH with some protrusion of the prostate into the bladder lumen with overlying mucosa showing hypervascularity.  There was no bleeding noted.\par \par The patient is comfortable with his current voiding status and will continue on tamsulosin.  I collected his urine which will be sent for cytology today.  I will be in touch with him with that result once available.\par \par We reviewed the follow-up plan regarding BCG maintenance.  He is at an interval where he does not need an immediate maintenance therapy and I will plan a cystoscopy again in 3 months followed by another maintenance BCG round as long as he remains free of disease.

## 2022-02-10 NOTE — HISTORY OF PRESENT ILLNESS
[FreeTextEntry1] : Corey Byers returns to the office today.  He is a 71-year-old male with history of bladder cancer, high-grade but nonmuscle invasive disease.  pT1.  He has undergone transurethral resection and also subsequently has had BCG bladder instillation treatments.  He is undergone induction +2 separate maintenance therapies.  He is back today for follow-up with cystoscopy.  He is doing well overall and reports no episodes of hematuria his baseline urinary symptoms are unaffected.  He denies any new frequency or urgency and no dysuria.  The patient reports his appetite and weight have been stable.  He denies any unintentional weight loss or constipation.  He has no flank pain.

## 2022-02-12 LAB — URINE CYTOLOGY: NORMAL

## 2022-02-15 ENCOUNTER — APPOINTMENT (OUTPATIENT)
Dept: UROLOGY | Facility: CLINIC | Age: 72
End: 2022-02-15

## 2022-02-22 ENCOUNTER — APPOINTMENT (OUTPATIENT)
Dept: UROLOGY | Facility: CLINIC | Age: 72
End: 2022-02-22

## 2022-03-03 ENCOUNTER — APPOINTMENT (OUTPATIENT)
Dept: UROLOGY | Facility: CLINIC | Age: 72
End: 2022-03-03

## 2022-03-10 ENCOUNTER — APPOINTMENT (OUTPATIENT)
Dept: UROLOGY | Facility: CLINIC | Age: 72
End: 2022-03-10

## 2022-05-16 ENCOUNTER — RX RENEWAL (OUTPATIENT)
Age: 72
End: 2022-05-16

## 2022-05-24 ENCOUNTER — APPOINTMENT (OUTPATIENT)
Dept: UROLOGY | Facility: CLINIC | Age: 72
End: 2022-05-24

## 2022-05-24 ENCOUNTER — APPOINTMENT (OUTPATIENT)
Dept: UROLOGY | Facility: CLINIC | Age: 72
End: 2022-05-24
Payer: MEDICARE

## 2022-05-24 ENCOUNTER — OUTPATIENT (OUTPATIENT)
Dept: OUTPATIENT SERVICES | Facility: HOSPITAL | Age: 72
LOS: 1 days | End: 2022-05-24
Payer: MEDICARE

## 2022-05-24 VITALS
SYSTOLIC BLOOD PRESSURE: 131 MMHG | TEMPERATURE: 97.6 F | HEIGHT: 67 IN | RESPIRATION RATE: 16 BRPM | DIASTOLIC BLOOD PRESSURE: 89 MMHG | BODY MASS INDEX: 25.11 KG/M2 | WEIGHT: 160 LBS | HEART RATE: 61 BPM

## 2022-05-24 DIAGNOSIS — Z98.1 ARTHRODESIS STATUS: Chronic | ICD-10-CM

## 2022-05-24 DIAGNOSIS — Z98.890 OTHER SPECIFIED POSTPROCEDURAL STATES: Chronic | ICD-10-CM

## 2022-05-24 DIAGNOSIS — Z95.5 PRESENCE OF CORONARY ANGIOPLASTY IMPLANT AND GRAFT: Chronic | ICD-10-CM

## 2022-05-24 DIAGNOSIS — R35.0 FREQUENCY OF MICTURITION: ICD-10-CM

## 2022-05-24 PROCEDURE — 52000 CYSTOURETHROSCOPY: CPT

## 2022-05-24 PROCEDURE — 99213 OFFICE O/P EST LOW 20 MIN: CPT | Mod: 25

## 2022-05-24 PROCEDURE — 88112 CYTOPATH CELL ENHANCE TECH: CPT | Mod: 26

## 2022-05-24 NOTE — CONSULT NOTE ADULT - PROBLEM SELECTOR RECOMMENDATION 3
Pending Prescriptions:                       Disp   Refills    guaiFENesin-codeine (ROBITUSSIN AC) 100-10*473 mL 0          Routing refill request to provider for review/approval because:  Drug not on the [facility] refill protocol       
c/w home bp meds, on Tribenzor 10/5/12.5 mg qd at home  bp controlled, cont toprol, Tribenzor on hold at this time, can resume if SBP>130 at home

## 2022-05-28 LAB
BACTERIA UR CULT: ABNORMAL
URINE CYTOLOGY: NORMAL

## 2022-05-31 ENCOUNTER — APPOINTMENT (OUTPATIENT)
Dept: UROLOGY | Facility: CLINIC | Age: 72
End: 2022-05-31

## 2022-06-06 ENCOUNTER — APPOINTMENT (OUTPATIENT)
Dept: UROLOGY | Facility: CLINIC | Age: 72
End: 2022-06-06

## 2022-06-07 DIAGNOSIS — C67.8 MALIGNANT NEOPLASM OF OVERLAPPING SITES OF BLADDER: ICD-10-CM

## 2022-06-14 ENCOUNTER — APPOINTMENT (OUTPATIENT)
Dept: UROLOGY | Facility: CLINIC | Age: 72
End: 2022-06-14

## 2022-06-16 NOTE — DISCHARGE NOTE PROVIDER - NSDCDCMDCOMP_GEN_ALL_CORE
This document is complete and the patient is ready for discharge. Simponi Counseling:  I discussed with the patient the risks of golimumab including but not limited to myelosuppression, immunosuppression, autoimmune hepatitis, demyelinating diseases, lymphoma, and serious infections.  The patient understands that monitoring is required including a PPD at baseline and must alert us or the primary physician if symptoms of infection or other concerning signs are noted.

## 2022-06-21 ENCOUNTER — APPOINTMENT (OUTPATIENT)
Dept: UROLOGY | Facility: CLINIC | Age: 72
End: 2022-06-21

## 2022-06-29 ENCOUNTER — APPOINTMENT (OUTPATIENT)
Dept: OTOLARYNGOLOGY | Facility: CLINIC | Age: 72
End: 2022-06-29

## 2022-06-29 VITALS
SYSTOLIC BLOOD PRESSURE: 124 MMHG | HEIGHT: 67 IN | HEART RATE: 51 BPM | BODY MASS INDEX: 25.11 KG/M2 | DIASTOLIC BLOOD PRESSURE: 73 MMHG | WEIGHT: 160 LBS

## 2022-06-29 DIAGNOSIS — E04.1 NONTOXIC SINGLE THYROID NODULE: ICD-10-CM

## 2022-06-29 DIAGNOSIS — Z86.69 PERSONAL HISTORY OF OTHER DISEASES OF THE NERVOUS SYSTEM AND SENSE ORGANS: ICD-10-CM

## 2022-06-29 DIAGNOSIS — R49.0 DYSPHONIA: ICD-10-CM

## 2022-06-29 PROCEDURE — 99213 OFFICE O/P EST LOW 20 MIN: CPT | Mod: 25

## 2022-06-29 PROCEDURE — 69210 REMOVE IMPACTED EAR WAX UNI: CPT

## 2022-06-29 RX ORDER — OLMESARTAN MEDOXOMIL / AMLODIPINE BESYLATE / HYDROCHLOROTHIAZIDE 40; 5; 12.5 MG/1; MG/1; MG/1
40-5-12.5 TABLET, FILM COATED ORAL
Qty: 90 | Refills: 0 | Status: ACTIVE | COMMUNITY
Start: 2022-06-08

## 2022-06-29 RX ORDER — HYDROCORTISONE 25 MG/G
2.5 OINTMENT TOPICAL
Qty: 60 | Refills: 0 | Status: ACTIVE | COMMUNITY
Start: 2022-04-21

## 2022-06-29 NOTE — PHYSICAL EXAM
[Hearing Alvarez Test (Tuning Fork On Forehead)] : no lateralization of tone [Midline] : trachea located in midline position [Removed] : palatine tonsils previously removed [Normal] : no masses and lesions seen, face is symmetric [FreeTextEntry8] : cerumen impaction removed via curettage  [FreeTextEntry9] : cerumen impaction removed via curettage  [de-identified] : mildly inflamed turbs  [FreeTextEntry2] : sinuses nontender to percussion

## 2022-06-29 NOTE — ASSESSMENT
[FreeTextEntry1] : Reviewed and reconciled medications, allergies, PMHx, PSHx, SocHx, FMHx. \par \par h/o occasional hoarseness. \par The patient presents today for 1 year follow up\par \par Physical exam -\par cerumen impaction removed via curettage b/l \par mildly inflamed turbs \par tonsils have been removed\par \par Plan:\par cerumen removed b/l. FU 1 year

## 2022-06-29 NOTE — ADDENDUM
[FreeTextEntry1] : Documented by Rosalio Pruitt acting as scribe for Dr. Bell on 06/29/2022. \par \par All Medical record entries made by the scribe were at my. Dr. Bell direction and personally dictated by me on 06/29/2022. I have reviewed the chart and agree that the record accurately reflects my personal performance of the history, physical exam, assessment and plan. I have also personally directed, reviewed, and agreed with the discharge instructions.

## 2022-06-29 NOTE — HISTORY OF PRESENT ILLNESS
[de-identified] : The patient presents with h/o occasional hoarseness. The patient presents today for 1 year follow up. Pt reports everything to be fine but was told his has lots of wax in his ears.

## 2022-06-30 ENCOUNTER — APPOINTMENT (OUTPATIENT)
Dept: UROLOGY | Facility: CLINIC | Age: 72
End: 2022-06-30

## 2022-06-30 PROCEDURE — 99213 OFFICE O/P EST LOW 20 MIN: CPT

## 2022-09-13 ENCOUNTER — APPOINTMENT (OUTPATIENT)
Dept: UROLOGY | Facility: CLINIC | Age: 72
End: 2022-09-13

## 2022-09-13 ENCOUNTER — OUTPATIENT (OUTPATIENT)
Dept: OUTPATIENT SERVICES | Facility: HOSPITAL | Age: 72
LOS: 1 days | End: 2022-09-13
Payer: MEDICARE

## 2022-09-13 DIAGNOSIS — Z98.890 OTHER SPECIFIED POSTPROCEDURAL STATES: Chronic | ICD-10-CM

## 2022-09-13 DIAGNOSIS — Z95.5 PRESENCE OF CORONARY ANGIOPLASTY IMPLANT AND GRAFT: Chronic | ICD-10-CM

## 2022-09-13 DIAGNOSIS — R35.0 FREQUENCY OF MICTURITION: ICD-10-CM

## 2022-09-13 DIAGNOSIS — Z98.1 ARTHRODESIS STATUS: Chronic | ICD-10-CM

## 2022-09-13 PROCEDURE — 52000 CYSTOURETHROSCOPY: CPT

## 2022-09-13 PROCEDURE — 99213 OFFICE O/P EST LOW 20 MIN: CPT | Mod: 25

## 2022-09-13 PROCEDURE — 88112 CYTOPATH CELL ENHANCE TECH: CPT | Mod: 26

## 2022-09-15 DIAGNOSIS — C67.8 MALIGNANT NEOPLASM OF OVERLAPPING SITES OF BLADDER: ICD-10-CM

## 2022-09-16 LAB — URINE CYTOLOGY: NORMAL

## 2022-11-02 NOTE — REASON FOR VISIT
Patient is here alone today.    If any information or results need to be relayed from today's visit, the best way to contact the patient is via [phone] (mobile) - Patient gives verbal permission to leave a detailed voicemail at the number provided.       [Follow-up Visit ___] : a follow-up visit  for [unfilled]

## 2022-11-30 ENCOUNTER — NON-APPOINTMENT (OUTPATIENT)
Age: 72
End: 2022-11-30

## 2022-12-06 ENCOUNTER — APPOINTMENT (OUTPATIENT)
Dept: UROLOGY | Facility: CLINIC | Age: 72
End: 2022-12-06

## 2022-12-06 ENCOUNTER — OUTPATIENT (OUTPATIENT)
Dept: OUTPATIENT SERVICES | Facility: HOSPITAL | Age: 72
LOS: 1 days | End: 2022-12-06
Payer: MEDICARE

## 2022-12-06 ENCOUNTER — RESULT REVIEW (OUTPATIENT)
Age: 72
End: 2022-12-06

## 2022-12-06 VITALS
RESPIRATION RATE: 16 BRPM | HEART RATE: 68 BPM | TEMPERATURE: 97.2 F | SYSTOLIC BLOOD PRESSURE: 127 MMHG | DIASTOLIC BLOOD PRESSURE: 74 MMHG

## 2022-12-06 DIAGNOSIS — R35.0 FREQUENCY OF MICTURITION: ICD-10-CM

## 2022-12-06 PROCEDURE — 99213 OFFICE O/P EST LOW 20 MIN: CPT | Mod: 25

## 2022-12-06 PROCEDURE — 52000 CYSTOURETHROSCOPY: CPT

## 2022-12-06 PROCEDURE — 88112 CYTOPATH CELL ENHANCE TECH: CPT | Mod: 26

## 2022-12-08 ENCOUNTER — APPOINTMENT (OUTPATIENT)
Dept: UROLOGY | Facility: CLINIC | Age: 72
End: 2022-12-08

## 2022-12-09 LAB — URINE CYTOLOGY: NORMAL

## 2022-12-10 NOTE — ASSESSMENT
[FreeTextEntry1] : Urine was collected today for cytology.  Cystoscopic assessment of the bladder today showed a prior scar tissue from tumor resection with no residual overlying abnormality.  There was no erythema or any papillary features to suggest recurrent disease.  He appears to be free of bladder cancer at this time.\par \par The patient would like to continue with every 3-month cystoscopy assessments to ensure the absence of any recurrent disease.  I think this is reasonable given the lack of availability of BCG and the potential for maintenance therapy with the BCG national shortage.  I will also be arranging a follow-up CT scan as a urogram protocol to reevaluate at his next follow-up.\par \par

## 2022-12-10 NOTE — HISTORY OF PRESENT ILLNESS
[FreeTextEntry1] : Corey Byers returns to the office today.  He is a 72-year-old male with history of bladder cancer.  He had previously received BCG induction therapy +2 courses of maintenance BCG.  His last cystoscopy was performed in September 2022.  He has not had any recent recurrences of bladder cancer.  He also reports that his urinary stream and flow are normal.  He denies any hematuria or dysuria.  No recent infections of the bladder.  He denies any suprapubic or flank pain.  No fevers or chills.  No nausea or vomiting.

## 2022-12-12 DIAGNOSIS — C67.8 MALIGNANT NEOPLASM OF OVERLAPPING SITES OF BLADDER: ICD-10-CM

## 2023-01-31 NOTE — H&P PST ADULT - PATIENT ON (OXYGEN DELIVERY METHOD)
1.  Continue medication as previously prescribed. 2.  Make an appointment follow-up with your primary care providers. 3.  Monitor glucose closely. 4.  Return to the emergency department for any worsening or concerning symptoms. room air

## 2023-03-06 ENCOUNTER — RX RENEWAL (OUTPATIENT)
Age: 73
End: 2023-03-06

## 2023-03-09 ENCOUNTER — APPOINTMENT (OUTPATIENT)
Dept: UROLOGY | Facility: CLINIC | Age: 73
End: 2023-03-09

## 2023-03-16 ENCOUNTER — APPOINTMENT (OUTPATIENT)
Dept: UROLOGY | Facility: CLINIC | Age: 73
End: 2023-03-16
Payer: MEDICARE

## 2023-03-16 ENCOUNTER — OUTPATIENT (OUTPATIENT)
Dept: OUTPATIENT SERVICES | Facility: HOSPITAL | Age: 73
LOS: 1 days | End: 2023-03-16

## 2023-03-16 ENCOUNTER — OUTPATIENT (OUTPATIENT)
Dept: OUTPATIENT SERVICES | Facility: HOSPITAL | Age: 73
LOS: 1 days | End: 2023-03-16
Payer: MEDICARE

## 2023-03-16 ENCOUNTER — APPOINTMENT (OUTPATIENT)
Dept: CT IMAGING | Facility: IMAGING CENTER | Age: 73
End: 2023-03-16
Payer: MEDICARE

## 2023-03-16 DIAGNOSIS — R31.0 GROSS HEMATURIA: ICD-10-CM

## 2023-03-16 DIAGNOSIS — Z95.5 PRESENCE OF CORONARY ANGIOPLASTY IMPLANT AND GRAFT: Chronic | ICD-10-CM

## 2023-03-16 DIAGNOSIS — C67.8 MALIGNANT NEOPLASM OF OVERLAPPING SITES OF BLADDER: ICD-10-CM

## 2023-03-16 DIAGNOSIS — N41.9 INFLAMMATORY DISEASE OF PROSTATE, UNSPECIFIED: ICD-10-CM

## 2023-03-16 DIAGNOSIS — R35.0 FREQUENCY OF MICTURITION: ICD-10-CM

## 2023-03-16 DIAGNOSIS — Z98.890 OTHER SPECIFIED POSTPROCEDURAL STATES: Chronic | ICD-10-CM

## 2023-03-16 DIAGNOSIS — Z98.1 ARTHRODESIS STATUS: Chronic | ICD-10-CM

## 2023-03-16 DIAGNOSIS — N40.1 BENIGN PROSTATIC HYPERPLASIA WITH LOWER URINARY TRACT SYMPTOMS: ICD-10-CM

## 2023-03-16 PROCEDURE — 88112 CYTOPATH CELL ENHANCE TECH: CPT | Mod: 26

## 2023-03-16 PROCEDURE — 74178 CT ABD&PLV WO CNTR FLWD CNTR: CPT

## 2023-03-16 PROCEDURE — 52000 CYSTOURETHROSCOPY: CPT

## 2023-03-16 PROCEDURE — 99214 OFFICE O/P EST MOD 30 MIN: CPT | Mod: 25

## 2023-03-16 PROCEDURE — 74178 CT ABD&PLV WO CNTR FLWD CNTR: CPT | Mod: 26,MH

## 2023-03-16 NOTE — ASSESSMENT
[FreeTextEntry1] : Cystoscopy today showed evidence of nodular growth of the prostate with some mass effect on the bladder and protrusion of the prostate adenoma into the bladder lumen.  An area of hyperemia was noted which is likely the source of his recent hematuria.  The bladder inspection itself showed  mild to moderate trabeculations but no evidence of any bladder lesions to suggest recurrent bladder cancer.  There were no papillary changes or focal erythema along the bladder mucosa.\par \par The patient also had a CT urogram done earlier today to assess both the gross hematuria and history of bladder cancer.  This showed a cyst in the left kidney but no collecting system filling defects or bladder filling defects with the exception of the prostate protruding into the bladder.  The prostate was noted to be very significantly enlarged.  No stones were seen and no solid masses visualized.  The CT scan report was not available at the time of the visit today and I will be in touch with him if there are any notable findings.\par \par With these findings in mind, we will continue to keep him on surveillance.  I have prescribed him doxycycline as I believe there may be some component of prostatitis contributing to the hematuria here.  He will take this for the next 7 days twice daily.\par \par He will return to the office for routine surveillance cystoscopy follow-up.

## 2023-03-16 NOTE — HISTORY OF PRESENT ILLNESS
[FreeTextEntry1] : Corey Byers returns to the office today.  He is 72 years old with history of bladder cancer.  He has been free of recurrent disease on follow-up cystoscopies which have been done on a routine basis for about the last 2 years.  He returns today for routine surveillance although recently he was noticing some gross hematuria.  He says this has been going on for about 10 days and may have been exacerbated by him pushing during a bowel movement.  He reports it is getting better.  He denies any dysuria.  He continues to use tamsulosin and finasteride.  He notes that his stream seems to be strong with this combination therapy and he is not having any significant bother with irritative or obstructive urinary symptoms at this time.  He notes no flank pain or abdominal pain.  No perineal discomfort.

## 2023-03-17 LAB — URINE CYTOLOGY: NORMAL

## 2023-05-27 ENCOUNTER — NON-APPOINTMENT (OUTPATIENT)
Age: 73
End: 2023-05-27

## 2023-06-21 ENCOUNTER — RX RENEWAL (OUTPATIENT)
Age: 73
End: 2023-06-21

## 2023-06-22 ENCOUNTER — APPOINTMENT (OUTPATIENT)
Dept: UROLOGY | Facility: CLINIC | Age: 73
End: 2023-06-22
Payer: MEDICARE

## 2023-06-22 ENCOUNTER — OUTPATIENT (OUTPATIENT)
Dept: OUTPATIENT SERVICES | Facility: HOSPITAL | Age: 73
LOS: 1 days | End: 2023-06-22
Payer: MEDICARE

## 2023-06-22 VITALS
HEART RATE: 52 BPM | DIASTOLIC BLOOD PRESSURE: 73 MMHG | SYSTOLIC BLOOD PRESSURE: 138 MMHG | OXYGEN SATURATION: 95 % | TEMPERATURE: 98 F

## 2023-06-22 DIAGNOSIS — R35.0 FREQUENCY OF MICTURITION: ICD-10-CM

## 2023-06-22 DIAGNOSIS — Z95.5 PRESENCE OF CORONARY ANGIOPLASTY IMPLANT AND GRAFT: Chronic | ICD-10-CM

## 2023-06-22 DIAGNOSIS — Z98.890 OTHER SPECIFIED POSTPROCEDURAL STATES: Chronic | ICD-10-CM

## 2023-06-22 DIAGNOSIS — Z98.1 ARTHRODESIS STATUS: Chronic | ICD-10-CM

## 2023-06-22 PROCEDURE — 52000 CYSTOURETHROSCOPY: CPT

## 2023-06-22 PROCEDURE — 99213 OFFICE O/P EST LOW 20 MIN: CPT | Mod: 25

## 2023-06-22 PROCEDURE — 88112 CYTOPATH CELL ENHANCE TECH: CPT | Mod: 26

## 2023-06-24 LAB — URINE CYTOLOGY: NORMAL

## 2023-06-24 NOTE — HISTORY OF PRESENT ILLNESS
[FreeTextEntry1] : Corey Byers returns to the office today.  He is 73 years old and has history of bladder cancer.  His most recent positive pathology finding was in June 2021 when he had been found to have high-grade urothelial cell carcinoma of the bladder invasive of the lamina propria.  No evidence of muscle invasion was seen.  He was treated with intravesical therapy which was completed by November 2021.  He has been followed on surveillance cystoscopy since that time.  He has not had any recent events of any hematuria or dysuria.  No episodes of urinary retention.\par \par The patient is noted to have fairly marked enlargement of the prostate.  A PSA elevation had prompted MRI of the prostate in 2020 showing a prostate size at that time of 147 cm³ and no evidence of any suspicious lesions within the prostate.  That MRI it was what had suggested the presence of a bladder lesion ultimately leading to the diagnosis of bladder cancer.

## 2023-06-24 NOTE — ASSESSMENT
[FreeTextEntry1] : Cystoscopy reassessment today continues to show no evidence of locally recurrent disease.  He does have evidence of BPH on cystoscopy with trilobar hypertrophy and significant protrusion of the prostate into the bladder lumen.  He continues to use tamsulosin and finasteride for management of the BPH and associated bladder outlet obstruction.  He feels fairly comfortable with his voiding patterns at this time on this combination therapy and I will continue to follow him for the voiding symptoms also in the future.\par \par The patient will continue to follow-up for routine surveillance with cystoscopy and will make his next appointment to do so.  I have asked him to provide PSA levels on record from his ordering physician so that we can have a record in our chart as well.

## 2023-06-27 DIAGNOSIS — C67.8 MALIGNANT NEOPLASM OF OVERLAPPING SITES OF BLADDER: ICD-10-CM

## 2023-06-27 DIAGNOSIS — N40.1 BENIGN PROSTATIC HYPERPLASIA WITH LOWER URINARY TRACT SYMPTOMS: ICD-10-CM

## 2023-07-25 ENCOUNTER — APPOINTMENT (OUTPATIENT)
Dept: OTOLARYNGOLOGY | Facility: CLINIC | Age: 73
End: 2023-07-25
Payer: MEDICARE

## 2023-07-25 VITALS
WEIGHT: 158 LBS | HEIGHT: 67 IN | HEART RATE: 61 BPM | SYSTOLIC BLOOD PRESSURE: 114 MMHG | DIASTOLIC BLOOD PRESSURE: 72 MMHG | BODY MASS INDEX: 24.8 KG/M2

## 2023-07-25 DIAGNOSIS — H93.8X3 OTHER SPECIFIED DISORDERS OF EAR, BILATERAL: ICD-10-CM

## 2023-07-25 DIAGNOSIS — J31.0 CHRONIC RHINITIS: ICD-10-CM

## 2023-07-25 DIAGNOSIS — H61.20 IMPACTED CERUMEN, UNSPECIFIED EAR: ICD-10-CM

## 2023-07-25 DIAGNOSIS — H60.63 UNSPECIFIED CHRONIC OTITIS EXTERNA, BILATERAL: ICD-10-CM

## 2023-07-25 PROCEDURE — 99213 OFFICE O/P EST LOW 20 MIN: CPT | Mod: 25

## 2023-07-25 PROCEDURE — 69210 REMOVE IMPACTED EAR WAX UNI: CPT

## 2023-07-25 RX ORDER — ATORVASTATIN CALCIUM 20 MG/1
20 TABLET, FILM COATED ORAL
Refills: 0 | Status: COMPLETED | COMMUNITY
End: 2023-07-25

## 2023-07-25 RX ORDER — ATORVASTATIN CALCIUM 40 MG/1
40 TABLET, FILM COATED ORAL
Refills: 0 | Status: ACTIVE | COMMUNITY

## 2023-07-25 NOTE — CONSULT LETTER
Paperwork noting that patient may bear financial responsibility for procedure(s) performed in clinic today signed prior to proceeding with procedure(s). Furthermore, patient notified that they should contact their insurer to verify that your procedure/test has been approved and is a COVERED benefit. Although the [facility] staff does its due diligence, the insurance carrier gives the disclaimer that \"Although the procedure is authorized, this does not guarantee payment. \"    Ultimately the patient is responsible for payment. Botox is:  [x] Buy and Bill  [] Patient Supplied  Botox Reauthorization Questions:  1. Has the patient experienced a reduction in frequency of migraines since starting Botox? yes  a. If yes, by what percentage? 90%  2. Has the intensity of migraines decreased since starting Botox? yes  a. If yes, by what percentage?  90% [Dear  ___] : Dear  [unfilled], [Please see my note below.] : Please see my note below. [Consult Closing:] : Thank you very much for allowing me to participate in the care of this patient.  If you have any questions, please do not hesitate to contact me. [Sincerely,] : Sincerely, [Consult Letter:] : I had the pleasure of evaluating your patient, [unfilled]. [FreeTextEntry3] : Dragan Bell MD FACS

## 2023-07-25 NOTE — ADDENDUM
[FreeTextEntry1] : Documented by Arielle Cunningham acting as scribe for Dr. Bell on 07/25/2023.\par \par All Medical record entries made by the scribe were at my, Dr. Bell,direction and personally dictated by me on 07/25/2023. I have reviewed the chart and agree that the record accurately reflects my personal performance of the history, physical exam, assessment and plan. I have also personally directed, reviewed, and agreed with the discharge instructions.

## 2023-07-25 NOTE — ASSESSMENT
[FreeTextEntry1] : Reviewed and reconciled medications, allergies, PMHx, PSHx, SocHx, FMHx.\par \par Pt c/o clogged ears presents for an ear cleaning. \par \par Physical Exam -\par cerumen impaction removed via curettage b/l\par \par Plan: \par Cerumen removal b/l. FU one year.

## 2023-07-25 NOTE — PHYSICAL EXAM
[Hearing Alvarez Test (Tuning Fork On Forehead)] : no lateralization of tone [Midline] : trachea located in midline position [Normal] : no rashes [FreeTextEntry8] : cerumen impaction removed via curettage  [FreeTextEntry9] : cerumen impaction removed via curettage  [de-identified] : mildly inflamed turbinates

## 2023-08-25 ENCOUNTER — RX RENEWAL (OUTPATIENT)
Age: 73
End: 2023-08-25

## 2023-09-21 ENCOUNTER — APPOINTMENT (OUTPATIENT)
Dept: UROLOGY | Facility: CLINIC | Age: 73
End: 2023-09-21
Payer: MEDICARE

## 2023-09-21 ENCOUNTER — OUTPATIENT (OUTPATIENT)
Dept: OUTPATIENT SERVICES | Facility: HOSPITAL | Age: 73
LOS: 1 days | End: 2023-09-21
Payer: MEDICARE

## 2023-09-21 VITALS
RESPIRATION RATE: 16 BRPM | OXYGEN SATURATION: 97 % | DIASTOLIC BLOOD PRESSURE: 75 MMHG | SYSTOLIC BLOOD PRESSURE: 136 MMHG | HEART RATE: 51 BPM

## 2023-09-21 DIAGNOSIS — Z95.5 PRESENCE OF CORONARY ANGIOPLASTY IMPLANT AND GRAFT: Chronic | ICD-10-CM

## 2023-09-21 DIAGNOSIS — Z98.890 OTHER SPECIFIED POSTPROCEDURAL STATES: Chronic | ICD-10-CM

## 2023-09-21 DIAGNOSIS — R35.0 FREQUENCY OF MICTURITION: ICD-10-CM

## 2023-09-21 DIAGNOSIS — Z98.1 ARTHRODESIS STATUS: Chronic | ICD-10-CM

## 2023-09-21 PROCEDURE — 52000 CYSTOURETHROSCOPY: CPT

## 2023-09-21 PROCEDURE — 99213 OFFICE O/P EST LOW 20 MIN: CPT | Mod: 25

## 2023-09-25 DIAGNOSIS — N40.1 BENIGN PROSTATIC HYPERPLASIA WITH LOWER URINARY TRACT SYMPTOMS: ICD-10-CM

## 2023-09-25 DIAGNOSIS — Z85.51 PERSONAL HISTORY OF MALIGNANT NEOPLASM OF BLADDER: ICD-10-CM

## 2023-09-27 LAB — URINE CYTOLOGY: NORMAL

## 2023-11-20 ENCOUNTER — NON-APPOINTMENT (OUTPATIENT)
Age: 73
End: 2023-11-20

## 2024-01-25 ENCOUNTER — APPOINTMENT (OUTPATIENT)
Dept: UROLOGY | Facility: CLINIC | Age: 74
End: 2024-01-25
Payer: MEDICARE

## 2024-01-25 ENCOUNTER — OUTPATIENT (OUTPATIENT)
Dept: OUTPATIENT SERVICES | Facility: HOSPITAL | Age: 74
LOS: 1 days | End: 2024-01-25
Payer: MEDICARE

## 2024-01-25 DIAGNOSIS — Z95.5 PRESENCE OF CORONARY ANGIOPLASTY IMPLANT AND GRAFT: Chronic | ICD-10-CM

## 2024-01-25 DIAGNOSIS — Z98.890 OTHER SPECIFIED POSTPROCEDURAL STATES: Chronic | ICD-10-CM

## 2024-01-25 DIAGNOSIS — R35.0 FREQUENCY OF MICTURITION: ICD-10-CM

## 2024-01-25 DIAGNOSIS — Z98.1 ARTHRODESIS STATUS: Chronic | ICD-10-CM

## 2024-01-25 PROCEDURE — 99214 OFFICE O/P EST MOD 30 MIN: CPT | Mod: 25

## 2024-01-25 PROCEDURE — 52000 CYSTOURETHROSCOPY: CPT

## 2024-01-27 LAB — URINE CYTOLOGY: NORMAL

## 2024-01-29 DIAGNOSIS — N40.1 BENIGN PROSTATIC HYPERPLASIA WITH LOWER URINARY TRACT SYMPTOMS: ICD-10-CM

## 2024-01-29 DIAGNOSIS — Z85.51 PERSONAL HISTORY OF MALIGNANT NEOPLASM OF BLADDER: ICD-10-CM

## 2024-04-16 ENCOUNTER — RX RENEWAL (OUTPATIENT)
Age: 74
End: 2024-04-16

## 2024-04-16 RX ORDER — FINASTERIDE 5 MG/1
5 TABLET, FILM COATED ORAL
Qty: 90 | Refills: 3 | Status: ACTIVE | COMMUNITY
Start: 2019-05-09 | End: 1900-01-01

## 2024-05-03 PROBLEM — N40.1 BENIGN LOCALIZED HYPERPLASIA OF PROSTATE WITH URINARY OBSTRUCTION: Noted: 2020-09-30

## 2024-05-03 PROBLEM — N40.1 BPH WITH OBSTRUCTION/LOWER URINARY TRACT SYMPTOMS: Status: ACTIVE | Noted: 2019-05-09

## 2024-05-03 PROBLEM — Z85.51: Status: RESOLVED | Noted: 2021-01-21 | Resolved: 2023-09-22

## 2024-05-07 ENCOUNTER — APPOINTMENT (OUTPATIENT)
Dept: UROLOGY | Facility: CLINIC | Age: 74
End: 2024-05-07
Payer: MEDICARE

## 2024-05-07 ENCOUNTER — OUTPATIENT (OUTPATIENT)
Dept: OUTPATIENT SERVICES | Facility: HOSPITAL | Age: 74
LOS: 1 days | End: 2024-05-07
Payer: MEDICARE

## 2024-05-07 VITALS
HEIGHT: 67 IN | RESPIRATION RATE: 17 BRPM | BODY MASS INDEX: 25.11 KG/M2 | DIASTOLIC BLOOD PRESSURE: 85 MMHG | WEIGHT: 160 LBS | HEART RATE: 53 BPM | SYSTOLIC BLOOD PRESSURE: 160 MMHG

## 2024-05-07 DIAGNOSIS — N40.1 BENIGN PROSTATIC HYPERPLASIA WITH LOWER URINARY TRACT SYMPMS: ICD-10-CM

## 2024-05-07 DIAGNOSIS — N13.8 BENIGN PROSTATIC HYPERPLASIA WITH LOWER URINARY TRACT SYMPMS: ICD-10-CM

## 2024-05-07 DIAGNOSIS — Z95.5 PRESENCE OF CORONARY ANGIOPLASTY IMPLANT AND GRAFT: Chronic | ICD-10-CM

## 2024-05-07 DIAGNOSIS — R35.0 FREQUENCY OF MICTURITION: ICD-10-CM

## 2024-05-07 DIAGNOSIS — Z98.1 ARTHRODESIS STATUS: Chronic | ICD-10-CM

## 2024-05-07 DIAGNOSIS — Z98.890 OTHER SPECIFIED POSTPROCEDURAL STATES: Chronic | ICD-10-CM

## 2024-05-07 DIAGNOSIS — Z85.51 PERSONAL HISTORY OF MALIGNANT NEOPLASM OF BLADDER: ICD-10-CM

## 2024-05-07 DIAGNOSIS — R97.20 ELEVATED PROSTATE, SPECIFIC ANTIGEN [PSA]: ICD-10-CM

## 2024-05-07 PROCEDURE — 52000 CYSTOURETHROSCOPY: CPT

## 2024-05-07 PROCEDURE — 99214 OFFICE O/P EST MOD 30 MIN: CPT | Mod: 25

## 2024-05-08 PROBLEM — R97.20 ELEVATED PROSTATE SPECIFIC ANTIGEN (PSA): Status: ACTIVE | Noted: 2020-09-30

## 2024-05-08 RX ORDER — DOXYCYCLINE HYCLATE 100 MG/1
100 TABLET ORAL
Qty: 14 | Refills: 0 | Status: DISCONTINUED | COMMUNITY
Start: 2023-03-16 | End: 2024-05-08

## 2024-05-08 NOTE — LETTER BODY
[FreeTextEntry2] : Salina Marie MD [address] [place] [address] [FreeTextEntry3] : Sincerely,      Tanner Sheehan MD, FACS Director of Urology Services, [facility] Chief of Urology, [facility]  of Urology   [facility], [place] [id number] P: [phone] F: [phone] [facility]

## 2024-05-08 NOTE — HISTORY OF PRESENT ILLNESS
[FreeTextEntry1] : Corey Byers returns to the office today primarily for follow-up on history of bladder cancer.  He also has BPH and is followed by me for that issue as well.  He is currently managing the BPH related urinary symptoms with finasteride.  He continues to experience stable voiding patterns with some bothersome symptoms at this point such as weakness of the stream and urinary urgency at times. No significant worsening of those symptoms.  He denies any gross hematuria or dysuria.  He has not had any recent issues of infection or retention.  His last cystoscopy was performed in January 2024.  This showed no evidence of any lesions within the bladder at that time.  His most recent finding of bladder cancer by cystoscopy was in February 2021.  At that time he had a tumor resected from the bladder that was high-grade with invasive features of the lamina propria.

## 2024-05-08 NOTE — ASSESSMENT
[FreeTextEntry1] : Cystoscopy today continues to show the absence of any disease recurrence.  The cystoscopy also shows bladder trabeculation and bladder outlet obstruction with nodular growth of the prostate protruding into the bladder lumen.  He will be continued on finasteride for management of the BPH symptoms.  He does not wish to consider additional treatment for BPH at this time as his symptoms are fairly stable although somewhat bothersome to him.  The patient has had some elevations of his PSA level in the past.  MRI of the prostate has been ordered to reassess the prostate as it has been sometime since this type of second-line screening has been performed in the setting of the PSA elevation.  Once the MRI results are available I will be in touch with him to review them.

## 2024-05-09 DIAGNOSIS — Z85.51 PERSONAL HISTORY OF MALIGNANT NEOPLASM OF BLADDER: ICD-10-CM

## 2024-05-09 DIAGNOSIS — R97.20 ELEVATED PROSTATE SPECIFIC ANTIGEN [PSA]: ICD-10-CM

## 2024-05-09 DIAGNOSIS — N40.1 BENIGN PROSTATIC HYPERPLASIA WITH LOWER URINARY TRACT SYMPTOMS: ICD-10-CM

## 2024-05-09 LAB — URINE CYTOLOGY: NORMAL

## 2024-06-07 ENCOUNTER — APPOINTMENT (OUTPATIENT)
Dept: MRI IMAGING | Facility: IMAGING CENTER | Age: 74
End: 2024-06-07
Payer: MEDICARE

## 2024-06-07 ENCOUNTER — OUTPATIENT (OUTPATIENT)
Dept: OUTPATIENT SERVICES | Facility: HOSPITAL | Age: 74
LOS: 1 days | End: 2024-06-07
Payer: MEDICARE

## 2024-06-07 ENCOUNTER — RESULT REVIEW (OUTPATIENT)
Age: 74
End: 2024-06-07

## 2024-06-07 DIAGNOSIS — Z95.5 PRESENCE OF CORONARY ANGIOPLASTY IMPLANT AND GRAFT: Chronic | ICD-10-CM

## 2024-06-07 DIAGNOSIS — Z98.1 ARTHRODESIS STATUS: Chronic | ICD-10-CM

## 2024-06-07 DIAGNOSIS — Z98.890 OTHER SPECIFIED POSTPROCEDURAL STATES: Chronic | ICD-10-CM

## 2024-06-07 DIAGNOSIS — R97.20 ELEVATED PROSTATE SPECIFIC ANTIGEN [PSA]: ICD-10-CM

## 2024-06-07 PROCEDURE — 76498P: CUSTOM | Mod: 26,MH

## 2024-06-07 PROCEDURE — 76498 UNLISTED MR PROCEDURE: CPT

## 2024-06-07 PROCEDURE — 72197 MRI PELVIS W/O & W/DYE: CPT

## 2024-06-07 PROCEDURE — 72197 MRI PELVIS W/O & W/DYE: CPT | Mod: 26,MH

## 2024-06-07 PROCEDURE — A9585: CPT

## 2024-06-11 ENCOUNTER — NON-APPOINTMENT (OUTPATIENT)
Age: 74
End: 2024-06-11

## 2024-06-12 ENCOUNTER — TRANSCRIPTION ENCOUNTER (OUTPATIENT)
Age: 74
End: 2024-06-12

## 2024-07-14 ENCOUNTER — NON-APPOINTMENT (OUTPATIENT)
Age: 74
End: 2024-07-14

## 2024-07-15 ENCOUNTER — APPOINTMENT (OUTPATIENT)
Dept: OTOLARYNGOLOGY | Facility: CLINIC | Age: 74
End: 2024-07-15
Payer: MEDICARE

## 2024-07-15 ENCOUNTER — APPOINTMENT (OUTPATIENT)
Dept: OTOLARYNGOLOGY | Facility: CLINIC | Age: 74
End: 2024-07-15

## 2024-07-15 VITALS
DIASTOLIC BLOOD PRESSURE: 75 MMHG | SYSTOLIC BLOOD PRESSURE: 124 MMHG | HEIGHT: 67 IN | WEIGHT: 159.25 LBS | BODY MASS INDEX: 24.99 KG/M2 | HEART RATE: 65 BPM

## 2024-07-15 DIAGNOSIS — H60.63 UNSPECIFIED CHRONIC OTITIS EXTERNA, BILATERAL: ICD-10-CM

## 2024-07-15 DIAGNOSIS — R13.10 DYSPHAGIA, UNSPECIFIED: ICD-10-CM

## 2024-07-15 DIAGNOSIS — R09.82 POSTNASAL DRIP: ICD-10-CM

## 2024-07-15 DIAGNOSIS — R09.89 OTHER SPECIFIED SYMPTOMS AND SIGNS INVOLVING THE CIRCULATORY AND RESPIRATORY SYSTEMS: ICD-10-CM

## 2024-07-15 DIAGNOSIS — J31.0 CHRONIC RHINITIS: ICD-10-CM

## 2024-07-15 DIAGNOSIS — R49.9 UNSPECIFIED VOICE AND RESONANCE DISORDER: ICD-10-CM

## 2024-07-15 DIAGNOSIS — J38.4 EDEMA OF LARYNX: ICD-10-CM

## 2024-07-15 PROCEDURE — 99214 OFFICE O/P EST MOD 30 MIN: CPT | Mod: 25

## 2024-07-15 PROCEDURE — 31575 DIAGNOSTIC LARYNGOSCOPY: CPT

## 2024-07-15 RX ORDER — IPRATROPIUM BROMIDE 42 UG/1
0.06 SPRAY NASAL
Qty: 1 | Refills: 5 | Status: ACTIVE | COMMUNITY
Start: 2024-07-15 | End: 1900-01-01

## 2024-09-05 ENCOUNTER — APPOINTMENT (OUTPATIENT)
Dept: UROLOGY | Facility: CLINIC | Age: 74
End: 2024-09-05

## 2024-09-12 ENCOUNTER — APPOINTMENT (OUTPATIENT)
Dept: UROLOGY | Facility: CLINIC | Age: 74
End: 2024-09-12
Payer: MEDICARE

## 2024-09-12 ENCOUNTER — OUTPATIENT (OUTPATIENT)
Dept: OUTPATIENT SERVICES | Facility: HOSPITAL | Age: 74
LOS: 1 days | End: 2024-09-12
Payer: MEDICARE

## 2024-09-12 VITALS — DIASTOLIC BLOOD PRESSURE: 85 MMHG | SYSTOLIC BLOOD PRESSURE: 139 MMHG

## 2024-09-12 DIAGNOSIS — R35.0 FREQUENCY OF MICTURITION: ICD-10-CM

## 2024-09-12 DIAGNOSIS — Z95.5 PRESENCE OF CORONARY ANGIOPLASTY IMPLANT AND GRAFT: Chronic | ICD-10-CM

## 2024-09-12 DIAGNOSIS — R97.20 ELEVATED PROSTATE, SPECIFIC ANTIGEN [PSA]: ICD-10-CM

## 2024-09-12 DIAGNOSIS — Z98.890 OTHER SPECIFIED POSTPROCEDURAL STATES: Chronic | ICD-10-CM

## 2024-09-12 DIAGNOSIS — N32.0 BLADDER-NECK OBSTRUCTION: ICD-10-CM

## 2024-09-12 DIAGNOSIS — Z85.51 PERSONAL HISTORY OF MALIGNANT NEOPLASM OF BLADDER: ICD-10-CM

## 2024-09-12 DIAGNOSIS — N40.1 BENIGN PROSTATIC HYPERPLASIA WITH LOWER URINARY TRACT SYMPMS: ICD-10-CM

## 2024-09-12 DIAGNOSIS — Z98.1 ARTHRODESIS STATUS: Chronic | ICD-10-CM

## 2024-09-12 DIAGNOSIS — N13.8 BENIGN PROSTATIC HYPERPLASIA WITH LOWER URINARY TRACT SYMPMS: ICD-10-CM

## 2024-09-12 DIAGNOSIS — Z87.898 PERSONAL HISTORY OF OTHER SPECIFIED CONDITIONS: ICD-10-CM

## 2024-09-12 PROCEDURE — 52281 CYSTOSCOPY AND TREATMENT: CPT

## 2024-09-12 PROCEDURE — 52000 CYSTOURETHROSCOPY: CPT

## 2024-09-12 PROCEDURE — 99214 OFFICE O/P EST MOD 30 MIN: CPT | Mod: 25

## 2024-09-14 PROBLEM — N32.0 BLADDER NECK STRICTURE: Status: ACTIVE | Noted: 2024-09-14

## 2024-09-14 PROBLEM — R97.20 ELEVATED PROSTATE SPECIFIC ANTIGEN (PSA): Noted: 2020-09-30

## 2024-09-14 PROBLEM — Z87.898 HISTORY OF GROSS HEMATURIA: Status: RESOLVED | Noted: 2021-02-12 | Resolved: 2024-09-14

## 2024-09-14 NOTE — PHYSICAL EXAM
[Normal Appearance] : normal appearance [Well Groomed] : well groomed [General Appearance - In No Acute Distress] : no acute distress [Edema] : no peripheral edema [Respiration, Rhythm And Depth] : normal respiratory rhythm and effort [Exaggerated Use Of Accessory Muscles For Inspiration] : no accessory muscle use [Abdomen Soft] : soft [Costovertebral Angle Tenderness] : no ~M costovertebral angle tenderness [Abdomen Tenderness] : non-tender [Urinary Bladder Findings] : the bladder was normal on palpation [Normal Station and Gait] : the gait and station were normal for the patient's age [] : no rash [Oriented To Time, Place, And Person] : oriented to person, place, and time [No Focal Deficits] : no focal deficits [Affect] : the affect was normal [Mood] : the mood was normal [No Palpable Adenopathy] : no palpable adenopathy

## 2024-09-14 NOTE — LETTER BODY
[Dear  ___] : Dear  [unfilled], [Courtesy Letter:] : I had the pleasure of seeing your patient, [unfilled], in my office today. [Please see my note below.] : Please see my note below. [FreeTextEntry2] : Salina Marie MD [address] [place] [address] [FreeTextEntry3] : Sincerely,      Tanner Sheehan MD, FACS Director of Urology Services, [facility] Chief of Urology, [facility]  of Urology   [facility], [place] [id number] P: [phone] F: [phone] [facility]

## 2024-09-14 NOTE — ASSESSMENT
[FreeTextEntry1] : I performed a repeat cystoscopy today.  Findings today included some scar tissue that had formed at the bladder neck, as a thin bridge between the 2 lateral lobes of the prostate.  I was able to pass the cystoscope through it but it did require dilation with the tip of the cystoscope.  After successful dilation, the bladder was inspected.  This showed moderate bladder trabeculation as has been noted previously.  The bladder showed no signs of any mucosal abnormality such as any new papillary lesions or focal erythema.  The prostate did continue to show enlargement with some protrusion into the bladder lumen.  No bladder stones or diverticuli were seen.  At this time the patient remains free of recurrence of bladder cancer.  He will continue to have surveillance cystoscopy.  He will also continue on finasteride for his baseline urinary symptoms and BPH as well as the history of gross hematuria.  This should help to minimize progression of voiding symptoms but I have encouraged him to let me know should he encounter any difficulty.  The patient did undergo a prostate MRI earlier this year for assessment of the PSA elevation.  The prostate showed a size of 121 cm at that time, PI-RADS category 2 without any evidence of concerning features.  This was stable compared with an additional prior MRI of the prostate.  PSA density remains low at 0.04 ng/mL/mL.  With these findings in mind, I do not think he needs to consider a biopsy of his prostate and would recommended continued annual PSA level.

## 2024-09-14 NOTE — HISTORY OF PRESENT ILLNESS
[FreeTextEntry1] : Corey Byers returns to the office today.  He is 74 years old with history of bladder cancer, BPH and PSA elevation.  He has undergone cystoscopy routinely and is back today for another cystoscopy for reassessment and surveillance of bladder cancer.  He has not seen any new symptoms of hematuria, change in urinary stream, urgency or frequency.  There is no dysuria present.  He continues to use finasteride for management of the BPH related urinary symptoms.  His symptoms are stable and have not shown any signs of progression.  He seems to be comfortable with voiding patterns although does have some baseline obstructive symptoms including some frequency and mild weakness of the urinary stream.

## 2024-09-15 LAB — URINE CYTOLOGY: NORMAL

## 2024-09-16 DIAGNOSIS — N40.1 BENIGN PROSTATIC HYPERPLASIA WITH LOWER URINARY TRACT SYMPTOMS: ICD-10-CM

## 2024-09-16 DIAGNOSIS — Z85.51 PERSONAL HISTORY OF MALIGNANT NEOPLASM OF BLADDER: ICD-10-CM

## 2024-09-16 DIAGNOSIS — N32.0 BLADDER-NECK OBSTRUCTION: ICD-10-CM

## 2024-09-16 DIAGNOSIS — N13.8 OTHER OBSTRUCTIVE AND REFLUX UROPATHY: ICD-10-CM

## 2025-01-21 ENCOUNTER — APPOINTMENT (OUTPATIENT)
Dept: UROLOGY | Facility: CLINIC | Age: 75
End: 2025-01-21
Payer: MEDICARE

## 2025-01-21 ENCOUNTER — OUTPATIENT (OUTPATIENT)
Dept: OUTPATIENT SERVICES | Facility: HOSPITAL | Age: 75
LOS: 1 days | End: 2025-01-21
Payer: MEDICARE

## 2025-01-21 VITALS
OXYGEN SATURATION: 97 % | HEIGHT: 67 IN | DIASTOLIC BLOOD PRESSURE: 81 MMHG | BODY MASS INDEX: 24.96 KG/M2 | WEIGHT: 159 LBS | SYSTOLIC BLOOD PRESSURE: 157 MMHG | RESPIRATION RATE: 16 BRPM | TEMPERATURE: 98 F | HEART RATE: 65 BPM

## 2025-01-21 DIAGNOSIS — Z98.1 ARTHRODESIS STATUS: Chronic | ICD-10-CM

## 2025-01-21 DIAGNOSIS — N40.1 BENIGN PROSTATIC HYPERPLASIA WITH LOWER URINARY TRACT SYMPMS: ICD-10-CM

## 2025-01-21 DIAGNOSIS — N13.8 BENIGN PROSTATIC HYPERPLASIA WITH LOWER URINARY TRACT SYMPMS: ICD-10-CM

## 2025-01-21 DIAGNOSIS — Z95.5 PRESENCE OF CORONARY ANGIOPLASTY IMPLANT AND GRAFT: Chronic | ICD-10-CM

## 2025-01-21 DIAGNOSIS — Z85.51 PERSONAL HISTORY OF MALIGNANT NEOPLASM OF BLADDER: ICD-10-CM

## 2025-01-21 DIAGNOSIS — N32.0 BLADDER-NECK OBSTRUCTION: ICD-10-CM

## 2025-01-21 DIAGNOSIS — R35.0 FREQUENCY OF MICTURITION: ICD-10-CM

## 2025-01-21 PROCEDURE — 52000 CYSTOURETHROSCOPY: CPT

## 2025-01-21 PROCEDURE — 99214 OFFICE O/P EST MOD 30 MIN: CPT | Mod: 25

## 2025-01-22 DIAGNOSIS — N40.1 BENIGN PROSTATIC HYPERPLASIA WITH LOWER URINARY TRACT SYMPTOMS: ICD-10-CM

## 2025-01-22 DIAGNOSIS — Z85.51 PERSONAL HISTORY OF MALIGNANT NEOPLASM OF BLADDER: ICD-10-CM

## 2025-01-22 DIAGNOSIS — N32.0 BLADDER-NECK OBSTRUCTION: ICD-10-CM

## 2025-01-24 LAB — URINE CYTOLOGY: NORMAL

## 2025-03-11 NOTE — ASSESSMENT
[FreeTextEntry1] : 12/15/2020: 70 yr male with BPH, pSA elevation , LUTs nocturia .  Acute left orchitis May 2019 resolved. \par Following for pSA elevation to rule out prostatic neoplasm\par \par patient concerned for prostate cancer preventive surveillance .  Asking if he was due for another MRI prostate.  Last MRI prostate 2016\par Knee chest position was used for digital rectal exam. No suspicious rectal masses. No rectal mucosal lesions. Anal tone is normal. The prostate is non tender, with normal texture, discrete borders, and no nodules. It is a 45 gram transurethral resection size. Prostate is wide. No gross blood on examining fingers. \par Repeat prostate mri\par PSA\par Alkaline phosphate\par UA micros\par Doing well on finasteride and flomax.  Has retrograde ejaculation but tolerable symptoms per patient. \par Continue finasteride \par Continue flomax \par \par 12/15/2020: The patient gave permission for a telephone visit. His PSA has been elevated and was 4.52 on 9/30/2020. He had an MRI on 12/9/2020 that showed there is an 0.8 x 0.8 cm polypoid lesion at the left bladder wall. This is consistent with bladder malignancy. 147 cc gland with mass effect on the bladder.\par No MRI suspicious prostate lesions. *PIRADS 1 - Very low (clinically significant cancer is highly unlikely to be present). Patient was never a smoker. \par \par I discussed the results of the patient's MRI with him. \par The patient will undergo a cystoscopy for the mass found in the bladder on the MRI. Dictation states left bladder wall mass but images show right 0.8 x 0.8 cm bladder mass. Radiology notified of discrepancy. I advised the patient to eat on the day of the procedure and can drive themselves if they like. I informed the patient we will insert lidocaine for local anesthesia. I discussed the risk of infection, but informed the patient that we will provide an antibiotic after the procedure and at bedtime. \par \par 01/19/2021: The patient presents today for a cystoscopy for a mass found in the bladder on the MRI which also stated PIRADS-1. The patient denies hematuria. He is on a diuretic and has some urinary frequency. He reports stable urination patterns. THe patient recently had a renal sonogram due to an elevated creatinine. He reports his testicles are feeling good and no longer ache. The patient was concerned at the risk of the tumors found on cystoscopy being cancerous. \par \par Lidocaine jelly was injected for lubrication and numbing. Had no urethral tumors, strictures or stones. Lateral lobe hypertrophy of the prostate. No bladder stones. 2+ trabeculated bladder. Right inferior bladder wall was deeply erythematous and had a carpet of papillary growth as well as two dome like papillary tumors, one larger about 2 cm in diameter and the other 0.8 cm in diameter. Both are suspicious for malignancy. The patient took one Bactrim tablet at the time of the procedure and will take one before bed. \par \par I informed him that it is about an 80% chance of the bladder tumors being cancer. \par I recommended shaving out the bladder tumors to send them for pathology. I explained to him the procedure process, risks and preventative measures after the procedure including medications. I informed him about the options he has if it is high grade. I am recommending him to  for the procedure who will further discuss it with him. \par \par I am sending the pt for a CT urogram since I explained to him that 5% of people who have these tumors in the bladder also have it in the upper tract. The patient will have his recent blood work faxed over. \par The patient will RTO after the CT urogram. \par \par 01/22/2021: The patient presents today to review the results of his most recent CT urogram. On his last visit he underwent a cystoscopy that showed right inferior bladder wall was deeply erythematous and had a carpet of papillary growth as well as two dome like papillary tumors, one larger about 2 cm in diameter and the other 0.8 cm in diameter. Both were suspicious for malignancy. I conferred with Dr.Eran Gallagher on his CT urogram from 1/20/2021. It showed enhancing right bladder wall mass in keeping with the clinical history of bladder tumor. No evidence of metastatic disease or suspicious lymphadenopathy in the abdomen and pelvis.Left renal cysts visualized. He appears good in the upper tracts. Enhancement seems confined to mucosa. No gross invasion of muscular layer. Prostate is very large. No ureteral or renal tumors in collecting system or parenchyma. Nocturia 2-3x. Denies any lower back pain. He takes aspirin every day and has 3 stents. \par \par I informed him he can stay on the aspirin for the procedure. \par The pt will undergo a TURBT with  and is scheduled for it on 2/2/2021. \par \par 02/05/2021: The patient presents today for a fill and pull which was successful. He did not have any burning after. He recently underwent a TURBT with  on 2/2/2021. Pathology report from 2/2/2021 showed invasive high grade urothelial carcinoma.The carcinoma invades lamina propria. Muscularis propria is not involved. No lymphovascular invasion. Benign prostate tissue also seen. \par \par Advised to drink a lot of fluids. \par He was informed to avoid sexual activity and any strenuous activity including lifting heavy objects. \par He will continue finasteride 5 mg once daily. \par I recommended the patient BCG treatments once a week for 6 weeks followed by cystoscopies every 3 months. I informed the patient that the injection may cause systemic illness resembling TB, urgency or frequency of urination, orchitis from BCG.\par The patient will start BCG treatments on 2/17/2021. If he is still experiencing some bleeding he will postpone for one more week.\par \par 02/12/2021: The patient presents today for a follow up. He reports gradual improvement overall. He is 10 days post op. Pt reports there is less blood when he urinates. He describes it as a little spout of blood when he first begins to void but then the rest of his stream is clear. He still feels some pressure when voiding but the discomfort has subsided. He denies any burning. He would like to put off the first BCG treatment for one more week. He reports a fever of 100.6 degrees Fahrenheit  last night that was relieved with Tylenol. Appetite is good and bowel movements are good. He originally had some constipation right after the procedure but that has resolved. \par \par I informed him that it would be a good idea to put off the BCG treatment for another 2 weeks since he is still bleeding. I explained to the pt that his dark urine is most likely from the prostate still bleeding and that it should resolve soon. A small portion of his prostate was removed so access to the tumor was easier. \par \par I informed the pt that his fever could be from possible atelectasis resolving but its also possible that it was a transient infection. The patient produced a urine sample which will be sent for urinalysis, urine cytology, and urine culture. Urine specimen was grossly bloody and opaque in high blood content. \par \par Informed the pt to continue to avoid strenuous activities. \par \par He is on aspirin that I instructed him to continue. \par \par We had a lengthy discussion about the course his disease might take as well as managing it. We discussed inter vesical therapy and maintenance. We discussed cystoscopic surveillance as well as upper tract imaging. We discussed potential for muscle invasive disease in the future. Spoke about considerations such as chemotherapy and cystectomy as well as radiation with radiation sensitizers. We also spoke about potential urinary diversions if cystectomy was performed. \par \par The pt will RTO in 2 weeks for start of BCG treatments so long as he is not bleeding anymore. \par \par Preparation, in person office visit, and coordination of care: 40 minutes.  no

## 2025-05-13 ENCOUNTER — APPOINTMENT (OUTPATIENT)
Dept: UROLOGY | Facility: CLINIC | Age: 75
End: 2025-05-13
Payer: MEDICARE

## 2025-05-13 ENCOUNTER — OUTPATIENT (OUTPATIENT)
Dept: OUTPATIENT SERVICES | Facility: HOSPITAL | Age: 75
LOS: 1 days | End: 2025-05-13
Payer: MEDICARE

## 2025-05-13 DIAGNOSIS — N13.8 BENIGN PROSTATIC HYPERPLASIA WITH LOWER URINARY TRACT SYMPMS: ICD-10-CM

## 2025-05-13 DIAGNOSIS — Z98.890 OTHER SPECIFIED POSTPROCEDURAL STATES: Chronic | ICD-10-CM

## 2025-05-13 DIAGNOSIS — Z95.5 PRESENCE OF CORONARY ANGIOPLASTY IMPLANT AND GRAFT: Chronic | ICD-10-CM

## 2025-05-13 DIAGNOSIS — N40.1 BENIGN PROSTATIC HYPERPLASIA WITH LOWER URINARY TRACT SYMPMS: ICD-10-CM

## 2025-05-13 DIAGNOSIS — Z85.51 PERSONAL HISTORY OF MALIGNANT NEOPLASM OF BLADDER: ICD-10-CM

## 2025-05-13 DIAGNOSIS — Z98.1 ARTHRODESIS STATUS: Chronic | ICD-10-CM

## 2025-05-13 DIAGNOSIS — R35.0 FREQUENCY OF MICTURITION: ICD-10-CM

## 2025-05-13 PROCEDURE — 52000 CYSTOURETHROSCOPY: CPT

## 2025-05-13 PROCEDURE — 99214 OFFICE O/P EST MOD 30 MIN: CPT | Mod: 25

## 2025-05-15 ENCOUNTER — APPOINTMENT (OUTPATIENT)
Dept: OTOLARYNGOLOGY | Facility: CLINIC | Age: 75
End: 2025-05-15
Payer: MEDICARE

## 2025-05-15 ENCOUNTER — NON-APPOINTMENT (OUTPATIENT)
Age: 75
End: 2025-05-15

## 2025-05-15 VITALS
HEIGHT: 67 IN | DIASTOLIC BLOOD PRESSURE: 80 MMHG | HEART RATE: 63 BPM | BODY MASS INDEX: 25.43 KG/M2 | WEIGHT: 162 LBS | SYSTOLIC BLOOD PRESSURE: 128 MMHG

## 2025-05-15 DIAGNOSIS — H93.8X3 OTHER SPECIFIED DISORDERS OF EAR, BILATERAL: ICD-10-CM

## 2025-05-15 DIAGNOSIS — J31.0 CHRONIC RHINITIS: ICD-10-CM

## 2025-05-15 DIAGNOSIS — H60.63 UNSPECIFIED CHRONIC OTITIS EXTERNA, BILATERAL: ICD-10-CM

## 2025-05-15 DIAGNOSIS — N40.1 BENIGN PROSTATIC HYPERPLASIA WITH LOWER URINARY TRACT SYMPTOMS: ICD-10-CM

## 2025-05-15 DIAGNOSIS — Z85.51 PERSONAL HISTORY OF MALIGNANT NEOPLASM OF BLADDER: ICD-10-CM

## 2025-05-15 DIAGNOSIS — R09.82 POSTNASAL DRIP: ICD-10-CM

## 2025-05-15 PROCEDURE — 99213 OFFICE O/P EST LOW 20 MIN: CPT

## 2025-05-15 RX ORDER — FLUTICASONE PROPIONATE 50 UG/1
50 SPRAY, METERED NASAL
Qty: 1 | Refills: 5 | Status: ACTIVE | COMMUNITY
Start: 2025-05-15 | End: 1900-01-01

## 2025-05-15 RX ORDER — TAMSULOSIN HYDROCHLORIDE 0.4 MG/1
0.4 CAPSULE ORAL
Refills: 0 | Status: ACTIVE | COMMUNITY

## 2025-05-19 LAB — URINE CYTOLOGY: NORMAL

## 2025-07-17 ENCOUNTER — APPOINTMENT (OUTPATIENT)
Dept: GASTROENTEROLOGY | Facility: CLINIC | Age: 75
End: 2025-07-17
Payer: MEDICARE

## 2025-07-17 VITALS
DIASTOLIC BLOOD PRESSURE: 82 MMHG | HEIGHT: 67 IN | HEART RATE: 62 BPM | SYSTOLIC BLOOD PRESSURE: 130 MMHG | WEIGHT: 162 LBS | BODY MASS INDEX: 25.43 KG/M2

## 2025-07-17 PROCEDURE — G2211 COMPLEX E/M VISIT ADD ON: CPT

## 2025-07-17 PROCEDURE — 99204 OFFICE O/P NEW MOD 45 MIN: CPT

## 2025-07-17 RX ORDER — SODIUM SULFATE, POTASSIUM SULFATE AND MAGNESIUM SULFATE 1.6; 3.13; 17.5 G/177ML; G/177ML; G/177ML
17.5-3.13-1.6 SOLUTION ORAL
Qty: 1 | Refills: 0 | Status: ACTIVE | COMMUNITY
Start: 2025-07-17 | End: 1900-01-01